# Patient Record
Sex: FEMALE | Race: WHITE | NOT HISPANIC OR LATINO | Employment: FULL TIME | URBAN - METROPOLITAN AREA
[De-identification: names, ages, dates, MRNs, and addresses within clinical notes are randomized per-mention and may not be internally consistent; named-entity substitution may affect disease eponyms.]

---

## 2023-04-17 LAB
ALANINE AMINOTRANSFERASE (SGPT) (U/L) IN SER/PLAS: 16 U/L (ref 7–45)
ALBUMIN (G/DL) IN SER/PLAS: 3.9 G/DL (ref 3.4–5)
ALKALINE PHOSPHATASE (U/L) IN SER/PLAS: 107 U/L (ref 33–110)
ANION GAP IN SER/PLAS: 12 MMOL/L (ref 10–20)
ASPARTATE AMINOTRANSFERASE (SGOT) (U/L) IN SER/PLAS: 15 U/L (ref 9–39)
BILIRUBIN TOTAL (MG/DL) IN SER/PLAS: 0.4 MG/DL (ref 0–1.2)
CALCIUM (MG/DL) IN SER/PLAS: 9 MG/DL (ref 8.6–10.3)
CARBON DIOXIDE, TOTAL (MMOL/L) IN SER/PLAS: 26 MMOL/L (ref 21–32)
CHLORIDE (MMOL/L) IN SER/PLAS: 105 MMOL/L (ref 98–107)
CHOLESTEROL (MG/DL) IN SER/PLAS: 149 MG/DL (ref 0–199)
CHOLESTEROL IN HDL (MG/DL) IN SER/PLAS: 60.2 MG/DL
CHOLESTEROL/HDL RATIO: 2.5
CREATININE (MG/DL) IN SER/PLAS: 0.8 MG/DL (ref 0.5–1.05)
ESTIMATED AVERAGE GLUCOSE FOR HBA1C: 134 MG/DL
GFR FEMALE: 89 ML/MIN/1.73M2
GLUCOSE (MG/DL) IN SER/PLAS: 112 MG/DL (ref 74–99)
HEMOGLOBIN A1C/HEMOGLOBIN TOTAL IN BLOOD: 6.3 %
LDL: 72 MG/DL (ref 0–99)
POTASSIUM (MMOL/L) IN SER/PLAS: 4.2 MMOL/L (ref 3.5–5.3)
PROTEIN TOTAL: 6.7 G/DL (ref 6.4–8.2)
SODIUM (MMOL/L) IN SER/PLAS: 139 MMOL/L (ref 136–145)
TRIGLYCERIDE (MG/DL) IN SER/PLAS: 84 MG/DL (ref 0–149)
UREA NITROGEN (MG/DL) IN SER/PLAS: 15 MG/DL (ref 6–23)
VLDL: 17 MG/DL (ref 0–40)

## 2023-04-21 ENCOUNTER — OFFICE VISIT (OUTPATIENT)
Dept: PRIMARY CARE | Facility: CLINIC | Age: 53
End: 2023-04-21
Payer: COMMERCIAL

## 2023-04-21 VITALS
BODY MASS INDEX: 45.24 KG/M2 | WEIGHT: 265.6 LBS | SYSTOLIC BLOOD PRESSURE: 110 MMHG | TEMPERATURE: 97.9 F | DIASTOLIC BLOOD PRESSURE: 80 MMHG

## 2023-04-21 DIAGNOSIS — E66.01 CLASS 3 SEVERE OBESITY WITHOUT SERIOUS COMORBIDITY WITH BODY MASS INDEX (BMI) OF 45.0 TO 49.9 IN ADULT, UNSPECIFIED OBESITY TYPE (MULTI): ICD-10-CM

## 2023-04-21 DIAGNOSIS — R73.03 PREDIABETES: Primary | ICD-10-CM

## 2023-04-21 PROBLEM — K57.30 DIVERTICULOSIS OF COLON: Status: ACTIVE | Noted: 2021-10-07

## 2023-04-21 PROBLEM — J98.4 SMALL AIRWAYS DISEASE: Status: ACTIVE | Noted: 2023-04-21

## 2023-04-21 PROBLEM — R32 URINARY INCONTINENCE: Status: ACTIVE | Noted: 2023-04-21

## 2023-04-21 PROBLEM — K21.9 GASTROESOPHAGEAL REFLUX DISEASE WITHOUT ESOPHAGITIS: Status: ACTIVE | Noted: 2021-09-27

## 2023-04-21 PROBLEM — E03.9 HYPOTHYROIDISM: Status: ACTIVE | Noted: 2022-02-22

## 2023-04-21 PROBLEM — R13.10 SWALLOWING IMPAIRED: Status: ACTIVE | Noted: 2023-04-21

## 2023-04-21 PROCEDURE — 1036F TOBACCO NON-USER: CPT | Performed by: FAMILY MEDICINE

## 2023-04-21 PROCEDURE — 99214 OFFICE O/P EST MOD 30 MIN: CPT | Performed by: FAMILY MEDICINE

## 2023-04-21 PROCEDURE — 3008F BODY MASS INDEX DOCD: CPT | Performed by: FAMILY MEDICINE

## 2023-04-21 RX ORDER — SEMAGLUTIDE 1 MG/.5ML
1 INJECTION, SOLUTION SUBCUTANEOUS
Qty: 2 ML | Refills: 0 | Status: SHIPPED | OUTPATIENT
Start: 2023-04-21 | End: 2023-07-24 | Stop reason: SDUPTHER

## 2023-04-21 RX ORDER — INHALER, ASSIST DEVICES
SPACER (EA) MISCELLANEOUS
COMMUNITY
Start: 2022-07-12 | End: 2024-02-09 | Stop reason: ALTCHOICE

## 2023-04-21 RX ORDER — ALBUTEROL SULFATE 90 UG/1
AEROSOL, METERED RESPIRATORY (INHALATION)
COMMUNITY
Start: 2022-07-08 | End: 2024-02-09 | Stop reason: ALTCHOICE

## 2023-04-21 RX ORDER — OMEPRAZOLE 20 MG/1
CAPSULE, DELAYED RELEASE ORAL
COMMUNITY
Start: 2021-08-17

## 2023-04-21 RX ORDER — LEVOTHYROXINE SODIUM 75 UG/1
75 TABLET ORAL
COMMUNITY
Start: 2022-05-06 | End: 2024-01-25 | Stop reason: SDUPTHER

## 2023-04-21 RX ORDER — SEMAGLUTIDE 0.5 MG/.5ML
0.5 INJECTION, SOLUTION SUBCUTANEOUS
Qty: 2 ML | Refills: 0 | Status: SHIPPED | OUTPATIENT
Start: 2023-04-21 | End: 2023-07-24 | Stop reason: SDUPTHER

## 2023-04-21 RX ORDER — SEMAGLUTIDE 0.25 MG/.5ML
0.25 INJECTION, SOLUTION SUBCUTANEOUS
Qty: 2 ML | Refills: 0 | Status: SHIPPED | OUTPATIENT
Start: 2023-04-21 | End: 2023-06-06 | Stop reason: SDUPTHER

## 2023-04-21 ASSESSMENT — PATIENT HEALTH QUESTIONNAIRE - PHQ9
1. LITTLE INTEREST OR PLEASURE IN DOING THINGS: NOT AT ALL
2. FEELING DOWN, DEPRESSED OR HOPELESS: NOT AT ALL
SUM OF ALL RESPONSES TO PHQ9 QUESTIONS 1 AND 2: 0

## 2023-04-21 ASSESSMENT — ENCOUNTER SYMPTOMS
DYSURIA: 0
WOUND: 0
FATIGUE: 0
SLEEP DISTURBANCE: 0
BLOOD IN STOOL: 0
NAUSEA: 0
SHORTNESS OF BREATH: 0
LIGHT-HEADEDNESS: 0
ARTHRALGIAS: 0
DYSPHORIC MOOD: 0
VOMITING: 0
ACTIVITY CHANGE: 0
PALPITATIONS: 0
NERVOUS/ANXIOUS: 0
ABDOMINAL PAIN: 0
COUGH: 0
DIZZINESS: 0
JOINT SWELLING: 0
CONSTIPATION: 0
EYE PAIN: 0
SINUS PRESSURE: 0
HEADACHES: 0
DIARRHEA: 0
APPETITE CHANGE: 0

## 2023-04-21 NOTE — PATIENT INSTRUCTIONS
Week of Semaglutide (Wegovy) Therapy     Dose (mg) Once Weekly  If a dose is not tolerated, consider delaying further dose increases for 4 weeks.    Weeks 1 through 4-0.25 mg once weekly    Weeks 5 through 8-0.5 mg once weekly    Weeks 9 though 12- 1 mg once weekly    Weeks 13 through 16- 1.7 mg once weekly    Please visit [email or web address]    Labs due in 3 months prior to next appt    Please continue to work on healthy diet and exercise    Call with any concerns

## 2023-04-21 NOTE — PROGRESS NOTES
Subjective   Patient ID: Joanne Weir is a 52 y.o. female who presents for Follow-up (Follow up labs ).    HPI   Here for follow-up  Over the last 3 months she has continue to work on healthy lifestyle habits.  Weight has not increased or decreased  She is taking all medications as directed  She has been checking her blood pressure at home and is consistently around 110 over 70s  Overall she is feeling well  She is frustrated by her difficult weight loss and wondering about medication options  She has never tried medications in the past  She has no history of medullary thyroid cancer or multiple endocrine neoplasia  Past medical, surgical, family and social history updated in chart  Review of Systems   Constitutional:  Negative for activity change, appetite change and fatigue.   HENT:  Negative for congestion, postnasal drip and sinus pressure.    Eyes:  Negative for pain and visual disturbance.   Respiratory:  Negative for cough and shortness of breath.    Cardiovascular:  Negative for chest pain, palpitations and leg swelling.   Gastrointestinal:  Negative for abdominal pain, blood in stool, constipation, diarrhea, nausea and vomiting.   Endocrine: Negative for cold intolerance and heat intolerance.   Genitourinary:  Negative for dysuria and menstrual problem.   Musculoskeletal:  Negative for arthralgias and joint swelling.   Skin:  Negative for rash and wound.   Neurological:  Negative for dizziness, light-headedness and headaches.   Psychiatric/Behavioral:  Negative for dysphoric mood and sleep disturbance. The patient is not nervous/anxious.        Objective   /80   Temp 36.6 °C (97.9 °F)   Wt 120 kg (265 lb 9.6 oz)   BMI 45.24 kg/m²     Physical Exam  Vitals and nursing note reviewed.   Constitutional:       Appearance: Normal appearance. She is normal weight.   HENT:      Head: Normocephalic and atraumatic.      Right Ear: External ear normal.      Left Ear: External ear normal.      Mouth/Throat:       Mouth: Mucous membranes are moist.   Eyes:      Conjunctiva/sclera: Conjunctivae normal.   Neck:      Thyroid: No thyroid mass, thyromegaly or thyroid tenderness.   Cardiovascular:      Rate and Rhythm: Normal rate and regular rhythm.      Heart sounds: Normal heart sounds.   Pulmonary:      Effort: Pulmonary effort is normal.      Breath sounds: Normal breath sounds.   Musculoskeletal:         General: No swelling.      Cervical back: Normal range of motion and neck supple.   Lymphadenopathy:      Cervical: No cervical adenopathy.   Skin:     General: Skin is warm and dry.      Capillary Refill: Capillary refill takes less than 2 seconds.   Neurological:      General: No focal deficit present.      Mental Status: She is alert. Mental status is at baseline.   Psychiatric:         Mood and Affect: Mood normal.         Behavior: Behavior normal.         Thought Content: Thought content normal.         Judgment: Judgment normal.         Assessment/Plan   Diagnoses and all orders for this visit:    Prediabetes  -     Hemoglobin A1C; Future  -     Comprehensive metabolic panel; Future  -     TSH with reflex to Free T4 if abnormal; Future  -     semaglutide, weight loss, (Wegovy) 0.25 mg/0.5 mL pen injector; Inject 0.25 mg under the skin every 7 days.  -     semaglutide, weight loss, (Wegovy) 0.5 mg/0.5 mL pen injector; Inject 0.5 mg under the skin every 7 days.  -     semaglutide, weight loss, (Wegovy) 1 mg/0.5 mL pen injector; Inject 1 mg under the skin every 7 days.  Class 3 severe obesity without serious comorbidity with body mass index (BMI) of 45.0 to 49.9 in adult, unspecified obesity type (CMS/HCC)  -     Hemoglobin A1C; Future  -     Comprehensive metabolic panel; Future  -     TSH with reflex to Free T4 if abnormal; Future  -     semaglutide, weight loss, (Wegovy) 0.25 mg/0.5 mL pen injector; Inject 0.25 mg under the skin every 7 days.  -     semaglutide, weight loss, (Wegovy) 0.5 mg/0.5 mL pen injector;  Inject 0.5 mg under the skin every 7 days.  -     semaglutide, weight loss, (Wegovy) 1 mg/0.5 mL pen injector; Inject 1 mg under the skin every 7 days.    Discussed with patient risk benefits side effects of Wegovy.  I believe she is a good candidate.  She has no contraindications to use  We will start and titrate up.  We will follow-up with labs and appointment in about 3 months  Discussed goal weight loss of 7.5% of body weight by that point  She will call with any concerns sooner  Instructed her to go to website for  for injection use and further patient education  Karson Mack, DO

## 2023-07-19 ENCOUNTER — LAB (OUTPATIENT)
Dept: LAB | Facility: LAB | Age: 53
End: 2023-07-19
Payer: COMMERCIAL

## 2023-07-19 DIAGNOSIS — E66.01 CLASS 3 SEVERE OBESITY WITHOUT SERIOUS COMORBIDITY WITH BODY MASS INDEX (BMI) OF 45.0 TO 49.9 IN ADULT, UNSPECIFIED OBESITY TYPE (MULTI): ICD-10-CM

## 2023-07-19 DIAGNOSIS — R73.03 PREDIABETES: ICD-10-CM

## 2023-07-19 LAB
ALANINE AMINOTRANSFERASE (SGPT) (U/L) IN SER/PLAS: 15 U/L (ref 7–45)
ALBUMIN (G/DL) IN SER/PLAS: 3.7 G/DL (ref 3.4–5)
ALKALINE PHOSPHATASE (U/L) IN SER/PLAS: 105 U/L (ref 33–110)
ANION GAP IN SER/PLAS: 9 MMOL/L (ref 10–20)
ASPARTATE AMINOTRANSFERASE (SGOT) (U/L) IN SER/PLAS: 14 U/L (ref 9–39)
BILIRUBIN TOTAL (MG/DL) IN SER/PLAS: 0.4 MG/DL (ref 0–1.2)
CALCIUM (MG/DL) IN SER/PLAS: 8.4 MG/DL (ref 8.6–10.3)
CARBON DIOXIDE, TOTAL (MMOL/L) IN SER/PLAS: 26 MMOL/L (ref 21–32)
CHLORIDE (MMOL/L) IN SER/PLAS: 107 MMOL/L (ref 98–107)
CREATININE (MG/DL) IN SER/PLAS: 0.73 MG/DL (ref 0.5–1.05)
ESTIMATED AVERAGE GLUCOSE FOR HBA1C: 123 MG/DL
GFR FEMALE: >90 ML/MIN/1.73M2
GLUCOSE (MG/DL) IN SER/PLAS: 102 MG/DL (ref 74–99)
HEMOGLOBIN A1C/HEMOGLOBIN TOTAL IN BLOOD: 5.9 %
POTASSIUM (MMOL/L) IN SER/PLAS: 4.3 MMOL/L (ref 3.5–5.3)
PROTEIN TOTAL: 6.4 G/DL (ref 6.4–8.2)
SODIUM (MMOL/L) IN SER/PLAS: 138 MMOL/L (ref 136–145)
THYROTROPIN (MIU/L) IN SER/PLAS BY DETECTION LIMIT <= 0.05 MIU/L: 3.4 MIU/L (ref 0.44–3.98)
UREA NITROGEN (MG/DL) IN SER/PLAS: 13 MG/DL (ref 6–23)

## 2023-07-19 PROCEDURE — 84443 ASSAY THYROID STIM HORMONE: CPT

## 2023-07-19 PROCEDURE — 80053 COMPREHEN METABOLIC PANEL: CPT

## 2023-07-19 PROCEDURE — 36415 COLL VENOUS BLD VENIPUNCTURE: CPT

## 2023-07-19 PROCEDURE — 83036 HEMOGLOBIN GLYCOSYLATED A1C: CPT

## 2023-07-21 ENCOUNTER — OFFICE VISIT (OUTPATIENT)
Dept: PRIMARY CARE | Facility: CLINIC | Age: 53
End: 2023-07-21
Payer: COMMERCIAL

## 2023-07-21 VITALS
SYSTOLIC BLOOD PRESSURE: 108 MMHG | TEMPERATURE: 97.9 F | BODY MASS INDEX: 46.19 KG/M2 | DIASTOLIC BLOOD PRESSURE: 78 MMHG | OXYGEN SATURATION: 98 % | WEIGHT: 271.2 LBS | HEART RATE: 83 BPM

## 2023-07-21 DIAGNOSIS — E03.9 HYPOTHYROIDISM, UNSPECIFIED TYPE: Primary | ICD-10-CM

## 2023-07-21 DIAGNOSIS — R73.03 PREDIABETES: ICD-10-CM

## 2023-07-21 DIAGNOSIS — K21.9 GASTROESOPHAGEAL REFLUX DISEASE WITHOUT ESOPHAGITIS: ICD-10-CM

## 2023-07-21 DIAGNOSIS — E66.01 CLASS 3 SEVERE OBESITY WITHOUT SERIOUS COMORBIDITY WITH BODY MASS INDEX (BMI) OF 45.0 TO 49.9 IN ADULT, UNSPECIFIED OBESITY TYPE (MULTI): ICD-10-CM

## 2023-07-21 PROCEDURE — 99213 OFFICE O/P EST LOW 20 MIN: CPT | Performed by: FAMILY MEDICINE

## 2023-07-21 PROCEDURE — 3008F BODY MASS INDEX DOCD: CPT | Performed by: FAMILY MEDICINE

## 2023-07-21 PROCEDURE — 1036F TOBACCO NON-USER: CPT | Performed by: FAMILY MEDICINE

## 2023-07-21 ASSESSMENT — ENCOUNTER SYMPTOMS
COUGH: 0
VOMITING: 0
SHORTNESS OF BREATH: 0
ABDOMINAL PAIN: 0
ARTHRALGIAS: 0
DIZZINESS: 0
HEADACHES: 0
DYSURIA: 0
LIGHT-HEADEDNESS: 0
JOINT SWELLING: 0
SINUS PRESSURE: 0
DIARRHEA: 0
DYSPHORIC MOOD: 0
EYE PAIN: 0
NAUSEA: 0
APPETITE CHANGE: 0
BLOOD IN STOOL: 0
NERVOUS/ANXIOUS: 0
FATIGUE: 0
WOUND: 0
ACTIVITY CHANGE: 0
PALPITATIONS: 0
SLEEP DISTURBANCE: 0
CONSTIPATION: 0

## 2023-07-21 NOTE — PROGRESS NOTES
Subjective   Patient ID: Joanne Weir is a 52 y.o. female who presents for Follow-up.    HPI   Here for follow-up.  Has been unable to get Wegovy due to shortages after first month supply  She continues to work on healthy habits but is having difficult time with weight loss    Taking levothyroxine and omeprazole as directed without any issues    No new concerns  Review of Systems   Constitutional:  Negative for activity change, appetite change and fatigue.   HENT:  Negative for congestion, postnasal drip and sinus pressure.    Eyes:  Negative for pain and visual disturbance.   Respiratory:  Negative for cough and shortness of breath.    Cardiovascular:  Negative for chest pain, palpitations and leg swelling.   Gastrointestinal:  Negative for abdominal pain, blood in stool, constipation, diarrhea, nausea and vomiting.   Endocrine: Negative for cold intolerance and heat intolerance.   Genitourinary:  Negative for dysuria and menstrual problem.   Musculoskeletal:  Negative for arthralgias and joint swelling.   Skin:  Negative for rash and wound.   Neurological:  Negative for dizziness, light-headedness and headaches.   Psychiatric/Behavioral:  Negative for dysphoric mood and sleep disturbance. The patient is not nervous/anxious.        Objective   /78   Pulse 83   Temp 36.6 °C (97.9 °F)   Wt 123 kg (271 lb 3.2 oz)   SpO2 98%   BMI 46.19 kg/m²     Physical Exam  Vitals and nursing note reviewed.   Constitutional:       Appearance: Normal appearance. She is normal weight.   HENT:      Head: Normocephalic and atraumatic.      Right Ear: External ear normal.      Left Ear: External ear normal.      Mouth/Throat:      Mouth: Mucous membranes are moist.   Eyes:      Conjunctiva/sclera: Conjunctivae normal.   Cardiovascular:      Rate and Rhythm: Normal rate and regular rhythm.      Heart sounds: Normal heart sounds.   Pulmonary:      Effort: Pulmonary effort is normal.      Breath sounds: Normal breath sounds.    Musculoskeletal:         General: No swelling.      Cervical back: Normal range of motion and neck supple.      Right lower leg: No edema.      Left lower leg: No edema.   Skin:     General: Skin is warm and dry.      Capillary Refill: Capillary refill takes less than 2 seconds.   Neurological:      General: No focal deficit present.      Mental Status: She is alert. Mental status is at baseline.   Psychiatric:         Mood and Affect: Mood normal.         Behavior: Behavior normal.         Thought Content: Thought content normal.         Judgment: Judgment normal.         Assessment/Plan   1. Hypothyroidism, unspecified type  Controlled continue current medication  - Lipid Panel; Future  - TSH with reflex to Free T4 if abnormal; Future    2. Prediabetes  Improving.  Continue to work on healthy lifestyle changes.  We will continue to try to get Wegovy for patient  - Hemoglobin A1C; Future  - Lipid Panel; Future  - Comprehensive Metabolic Panel; Future    3. Gastroesophageal reflux disease without esophagitis  ccm  - CBC and Auto Differential; Future    4. Class 3 severe obesity without serious comorbidity with body mass index (BMI) of 45.0 to 49.9 in adult, unspecified obesity type (CMS/HCC)  We will continue to try to get patient Wegovy.  Continue to work on healthy diet and activity  - Lipid Panel; Future      Karson Mack, DO

## 2023-07-24 DIAGNOSIS — E66.01 CLASS 3 SEVERE OBESITY WITHOUT SERIOUS COMORBIDITY WITH BODY MASS INDEX (BMI) OF 45.0 TO 49.9 IN ADULT, UNSPECIFIED OBESITY TYPE (MULTI): ICD-10-CM

## 2023-07-24 DIAGNOSIS — R73.03 PREDIABETES: ICD-10-CM

## 2023-07-24 RX ORDER — SEMAGLUTIDE 0.5 MG/.5ML
0.5 INJECTION, SOLUTION SUBCUTANEOUS
Qty: 2 ML | Refills: 0 | Status: SHIPPED | OUTPATIENT
Start: 2023-07-24 | End: 2023-09-13

## 2023-07-24 RX ORDER — SEMAGLUTIDE 0.25 MG/.5ML
0.25 INJECTION, SOLUTION SUBCUTANEOUS
Qty: 2 ML | Refills: 0 | Status: SHIPPED | OUTPATIENT
Start: 2023-07-24 | End: 2023-08-08 | Stop reason: SDUPTHER

## 2023-07-24 RX ORDER — SEMAGLUTIDE 1 MG/.5ML
1 INJECTION, SOLUTION SUBCUTANEOUS
Qty: 2 ML | Refills: 0 | Status: SHIPPED | OUTPATIENT
Start: 2023-07-24 | End: 2023-09-13

## 2023-08-08 DIAGNOSIS — E66.01 CLASS 3 SEVERE OBESITY WITHOUT SERIOUS COMORBIDITY WITH BODY MASS INDEX (BMI) OF 45.0 TO 49.9 IN ADULT, UNSPECIFIED OBESITY TYPE (MULTI): ICD-10-CM

## 2023-08-08 DIAGNOSIS — R73.03 PREDIABETES: ICD-10-CM

## 2023-08-08 RX ORDER — SEMAGLUTIDE 0.25 MG/.5ML
0.25 INJECTION, SOLUTION SUBCUTANEOUS
Qty: 2 ML | Refills: 0 | Status: SHIPPED | OUTPATIENT
Start: 2023-08-08 | End: 2023-09-13

## 2023-09-13 DIAGNOSIS — E66.01 CLASS 3 SEVERE OBESITY WITHOUT SERIOUS COMORBIDITY WITH BODY MASS INDEX (BMI) OF 45.0 TO 49.9 IN ADULT, UNSPECIFIED OBESITY TYPE (MULTI): ICD-10-CM

## 2023-09-13 DIAGNOSIS — R73.03 PREDIABETES: Primary | ICD-10-CM

## 2023-09-13 RX ORDER — SEMAGLUTIDE 1.7 MG/.75ML
1.7 INJECTION, SOLUTION SUBCUTANEOUS
Qty: 3 ML | Refills: 0 | Status: SHIPPED | OUTPATIENT
Start: 2023-09-13 | End: 2023-09-15

## 2023-09-15 DIAGNOSIS — R73.03 PREDIABETES: ICD-10-CM

## 2023-09-15 DIAGNOSIS — E66.01 CLASS 3 SEVERE OBESITY WITHOUT SERIOUS COMORBIDITY WITH BODY MASS INDEX (BMI) OF 45.0 TO 49.9 IN ADULT, UNSPECIFIED OBESITY TYPE (MULTI): ICD-10-CM

## 2023-09-15 RX ORDER — SEMAGLUTIDE 0.25 MG/.5ML
0.25 INJECTION, SOLUTION SUBCUTANEOUS
Qty: 2 ML | Refills: 0 | Status: SHIPPED | OUTPATIENT
Start: 2023-09-15 | End: 2024-02-09

## 2023-09-15 RX ORDER — SEMAGLUTIDE 0.5 MG/.5ML
0.5 INJECTION, SOLUTION SUBCUTANEOUS
Qty: 2 ML | Refills: 0 | Status: SHIPPED | OUTPATIENT
Start: 2023-09-15 | End: 2024-02-09

## 2024-01-24 ENCOUNTER — LAB (OUTPATIENT)
Dept: LAB | Facility: LAB | Age: 54
End: 2024-01-24
Payer: COMMERCIAL

## 2024-01-24 DIAGNOSIS — E03.9 HYPOTHYROIDISM, UNSPECIFIED: ICD-10-CM

## 2024-01-24 DIAGNOSIS — E03.9 HYPOTHYROIDISM, UNSPECIFIED TYPE: ICD-10-CM

## 2024-01-24 DIAGNOSIS — E66.01 CLASS 3 SEVERE OBESITY WITHOUT SERIOUS COMORBIDITY WITH BODY MASS INDEX (BMI) OF 45.0 TO 49.9 IN ADULT, UNSPECIFIED OBESITY TYPE (MULTI): ICD-10-CM

## 2024-01-24 DIAGNOSIS — K21.9 GASTROESOPHAGEAL REFLUX DISEASE WITHOUT ESOPHAGITIS: ICD-10-CM

## 2024-01-24 DIAGNOSIS — R73.03 PREDIABETES: ICD-10-CM

## 2024-01-24 LAB
ALBUMIN SERPL BCP-MCNC: 4 G/DL (ref 3.4–5)
ALP SERPL-CCNC: 98 U/L (ref 33–110)
ALT SERPL W P-5'-P-CCNC: 16 U/L (ref 7–45)
ANION GAP SERPL CALC-SCNC: 12 MMOL/L (ref 10–20)
AST SERPL W P-5'-P-CCNC: 16 U/L (ref 9–39)
BASOPHILS # BLD AUTO: 0.07 X10*3/UL (ref 0–0.1)
BASOPHILS NFR BLD AUTO: 0.9 %
BILIRUB SERPL-MCNC: 0.4 MG/DL (ref 0–1.2)
BUN SERPL-MCNC: 12 MG/DL (ref 6–23)
CALCIUM SERPL-MCNC: 8.7 MG/DL (ref 8.6–10.3)
CHLORIDE SERPL-SCNC: 105 MMOL/L (ref 98–107)
CHOLEST SERPL-MCNC: 142 MG/DL (ref 0–199)
CHOLESTEROL/HDL RATIO: 2.5
CO2 SERPL-SCNC: 27 MMOL/L (ref 21–32)
CREAT SERPL-MCNC: 0.72 MG/DL (ref 0.5–1.05)
EGFRCR SERPLBLD CKD-EPI 2021: >90 ML/MIN/1.73M*2
EOSINOPHIL # BLD AUTO: 0.67 X10*3/UL (ref 0–0.7)
EOSINOPHIL NFR BLD AUTO: 8.2 %
ERYTHROCYTE [DISTWIDTH] IN BLOOD BY AUTOMATED COUNT: 14.1 % (ref 11.5–14.5)
EST. AVERAGE GLUCOSE BLD GHB EST-MCNC: 126 MG/DL
GLUCOSE SERPL-MCNC: 107 MG/DL (ref 74–99)
HBA1C MFR BLD: 6 %
HCT VFR BLD AUTO: 42.4 % (ref 36–46)
HDLC SERPL-MCNC: 56 MG/DL
HGB BLD-MCNC: 13.3 G/DL (ref 12–16)
IMM GRANULOCYTES # BLD AUTO: 0.03 X10*3/UL (ref 0–0.7)
IMM GRANULOCYTES NFR BLD AUTO: 0.4 % (ref 0–0.9)
LDLC SERPL CALC-MCNC: 66 MG/DL
LYMPHOCYTES # BLD AUTO: 2.37 X10*3/UL (ref 1.2–4.8)
LYMPHOCYTES NFR BLD AUTO: 28.9 %
MCH RBC QN AUTO: 27 PG (ref 26–34)
MCHC RBC AUTO-ENTMCNC: 31.4 G/DL (ref 32–36)
MCV RBC AUTO: 86 FL (ref 80–100)
MONOCYTES # BLD AUTO: 0.7 X10*3/UL (ref 0.1–1)
MONOCYTES NFR BLD AUTO: 8.5 %
NEUTROPHILS # BLD AUTO: 4.35 X10*3/UL (ref 1.2–7.7)
NEUTROPHILS NFR BLD AUTO: 53.1 %
NON HDL CHOLESTEROL: 86 MG/DL (ref 0–149)
NRBC BLD-RTO: 0 /100 WBCS (ref 0–0)
PLATELET # BLD AUTO: 273 X10*3/UL (ref 150–450)
POTASSIUM SERPL-SCNC: 4.1 MMOL/L (ref 3.5–5.3)
PROT SERPL-MCNC: 6.7 G/DL (ref 6.4–8.2)
RBC # BLD AUTO: 4.93 X10*6/UL (ref 4–5.2)
SODIUM SERPL-SCNC: 140 MMOL/L (ref 136–145)
TRIGL SERPL-MCNC: 98 MG/DL (ref 0–149)
TSH SERPL-ACNC: 3.3 MIU/L (ref 0.44–3.98)
VLDL: 20 MG/DL (ref 0–40)
WBC # BLD AUTO: 8.2 X10*3/UL (ref 4.4–11.3)

## 2024-01-24 PROCEDURE — 80061 LIPID PANEL: CPT

## 2024-01-24 PROCEDURE — 84443 ASSAY THYROID STIM HORMONE: CPT

## 2024-01-24 PROCEDURE — 83036 HEMOGLOBIN GLYCOSYLATED A1C: CPT

## 2024-01-24 PROCEDURE — 36415 COLL VENOUS BLD VENIPUNCTURE: CPT

## 2024-01-24 PROCEDURE — 80053 COMPREHEN METABOLIC PANEL: CPT

## 2024-01-24 PROCEDURE — 85025 COMPLETE CBC W/AUTO DIFF WBC: CPT

## 2024-01-25 RX ORDER — LEVOTHYROXINE SODIUM 75 UG/1
75 TABLET ORAL DAILY
Qty: 90 TABLET | Refills: 3 | Status: SHIPPED | OUTPATIENT
Start: 2024-01-25

## 2024-01-26 ENCOUNTER — APPOINTMENT (OUTPATIENT)
Dept: PRIMARY CARE | Facility: CLINIC | Age: 54
End: 2024-01-26
Payer: COMMERCIAL

## 2024-02-09 ENCOUNTER — OFFICE VISIT (OUTPATIENT)
Dept: PRIMARY CARE | Facility: CLINIC | Age: 54
End: 2024-02-09
Payer: COMMERCIAL

## 2024-02-09 VITALS
SYSTOLIC BLOOD PRESSURE: 140 MMHG | BODY MASS INDEX: 47.96 KG/M2 | WEIGHT: 281.6 LBS | DIASTOLIC BLOOD PRESSURE: 100 MMHG | TEMPERATURE: 98 F

## 2024-02-09 DIAGNOSIS — R03.0 ELEVATED BLOOD PRESSURE READING: ICD-10-CM

## 2024-02-09 DIAGNOSIS — E03.9 HYPOTHYROIDISM, UNSPECIFIED TYPE: Primary | ICD-10-CM

## 2024-02-09 DIAGNOSIS — E66.01 CLASS 3 SEVERE OBESITY WITHOUT SERIOUS COMORBIDITY WITH BODY MASS INDEX (BMI) OF 45.0 TO 49.9 IN ADULT, UNSPECIFIED OBESITY TYPE (MULTI): ICD-10-CM

## 2024-02-09 DIAGNOSIS — R73.03 PREDIABETES: ICD-10-CM

## 2024-02-09 PROCEDURE — 99214 OFFICE O/P EST MOD 30 MIN: CPT | Performed by: FAMILY MEDICINE

## 2024-02-09 PROCEDURE — 1036F TOBACCO NON-USER: CPT | Performed by: FAMILY MEDICINE

## 2024-02-09 RX ORDER — SEMAGLUTIDE 0.25 MG/.5ML
0.25 INJECTION, SOLUTION SUBCUTANEOUS
Qty: 2 ML | Refills: 0 | Status: SHIPPED | OUTPATIENT
Start: 2024-02-09 | End: 2024-06-03 | Stop reason: ALTCHOICE

## 2024-02-09 ASSESSMENT — ENCOUNTER SYMPTOMS
ARTHRALGIAS: 0
NAUSEA: 0
SINUS PRESSURE: 0
ACTIVITY CHANGE: 0
VOMITING: 0
WOUND: 0
SLEEP DISTURBANCE: 0
EYE PAIN: 0
SHORTNESS OF BREATH: 0
BLOOD IN STOOL: 0
COUGH: 0
DIZZINESS: 0
LIGHT-HEADEDNESS: 0
JOINT SWELLING: 0
FATIGUE: 0
ABDOMINAL PAIN: 0
CONSTIPATION: 0
DYSPHORIC MOOD: 0
DYSURIA: 0
DIARRHEA: 0
APPETITE CHANGE: 0
HEADACHES: 0
PALPITATIONS: 0
NERVOUS/ANXIOUS: 0

## 2024-02-09 ASSESSMENT — PATIENT HEALTH QUESTIONNAIRE - PHQ9
1. LITTLE INTEREST OR PLEASURE IN DOING THINGS: NOT AT ALL
SUM OF ALL RESPONSES TO PHQ9 QUESTIONS 1 AND 2: 0
2. FEELING DOWN, DEPRESSED OR HOPELESS: NOT AT ALL

## 2024-02-09 NOTE — PATIENT INSTRUCTIONS
Watch BP at home and send numbers in next few weeks   If we cannot get wegovy we will try metformin

## 2024-02-09 NOTE — PROGRESS NOTES
Subjective   Patient ID: Joanne Weir is a 53 y.o. female who presents for Follow-up (Lab review).    HPI   Seen today with   Here for follow-up.  Has been unable to get Wegovy due to shortages after first month supply  She continues to work on healthy habits but is having difficult time with weight loss.  Is very frustrated and overall    Taking levothyroxine and omeprazole as directed without any issues    Noted blood pressure elevated today  No BP meds in past   No recent ambulatory monitoring    No new concerns  Review of Systems   Constitutional:  Negative for activity change, appetite change and fatigue.   HENT:  Negative for congestion, postnasal drip and sinus pressure.    Eyes:  Negative for pain and visual disturbance.   Respiratory:  Negative for cough and shortness of breath.    Cardiovascular:  Negative for chest pain, palpitations and leg swelling.   Gastrointestinal:  Negative for abdominal pain, blood in stool, constipation, diarrhea, nausea and vomiting.   Endocrine: Negative for cold intolerance and heat intolerance.   Genitourinary:  Negative for dysuria and menstrual problem.   Musculoskeletal:  Negative for arthralgias and joint swelling.   Skin:  Negative for rash and wound.   Neurological:  Negative for dizziness, light-headedness and headaches.   Psychiatric/Behavioral:  Negative for dysphoric mood and sleep disturbance. The patient is not nervous/anxious.        Objective   BP (!) 140/100   Temp 36.7 °C (98 °F)   Wt 128 kg (281 lb 9.6 oz)   BMI 47.96 kg/m²     Physical Exam  Vitals and nursing note reviewed.   Constitutional:       Appearance: Normal appearance. She is normal weight.   HENT:      Head: Normocephalic and atraumatic.      Right Ear: External ear normal.      Left Ear: External ear normal.      Mouth/Throat:      Mouth: Mucous membranes are moist.   Eyes:      Conjunctiva/sclera: Conjunctivae normal.   Cardiovascular:      Rate and Rhythm: Normal rate and regular  "rhythm.      Heart sounds: Normal heart sounds.   Pulmonary:      Effort: Pulmonary effort is normal.      Breath sounds: Normal breath sounds.   Musculoskeletal:         General: No swelling.      Cervical back: Normal range of motion and neck supple.      Right lower leg: No edema.      Left lower leg: No edema.   Skin:     General: Skin is warm and dry.      Capillary Refill: Capillary refill takes less than 2 seconds.   Neurological:      General: No focal deficit present.      Mental Status: She is alert. Mental status is at baseline.   Psychiatric:         Mood and Affect: Mood normal.         Behavior: Behavior normal.         Thought Content: Thought content normal.         Judgment: Judgment normal.     Lab Results   Component Value Date    TSH 3.30 01/24/2024    THYROIDPAB <28 08/31/2021     Lab Results   Component Value Date    HGBA1C 6.0 (H) 01/24/2024     Lab Results   Component Value Date    CHOL 142 01/24/2024    CHOL 149 04/17/2023    CHOL 143 05/02/2022     Lab Results   Component Value Date    HDL 56.0 01/24/2024    HDL 60.2 04/17/2023    HDL 51.9 05/02/2022     Lab Results   Component Value Date    LDLCALC 66 01/24/2024     Lab Results   Component Value Date    TRIG 98 01/24/2024    TRIG 84 04/17/2023    TRIG 73 05/02/2022     No components found for: \"CHOLHDL\"  Lab Results   Component Value Date    GLUCOSE 107 (H) 01/24/2024    CALCIUM 8.7 01/24/2024     01/24/2024    K 4.1 01/24/2024    CO2 27 01/24/2024     01/24/2024    BUN 12 01/24/2024    CREATININE 0.72 01/24/2024     Lab Results   Component Value Date    ALT 16 01/24/2024    AST 16 01/24/2024    ALKPHOS 98 01/24/2024    BILITOT 0.4 01/24/2024     Lab Results   Component Value Date    WBC 8.2 01/24/2024    HGB 13.3 01/24/2024    HCT 42.4 01/24/2024    MCV 86 01/24/2024     01/24/2024       Assessment/Plan   1. Prediabetes 2. Class 3 severe obesity without serious comorbidity with body mass index (BMI) of 45.0 to 49.9 in " adult, unspecified obesity type (CMS/HCC)  We will try again to get Wegovy at low starting dose.  If unable to we did discuss the option of trying metformin which patient is agreeable to  Discussed options for bariatric care team.  She will consider summa  - semaglutide, weight loss, (Wegovy) 0.25 mg/0.5 mL pen injector; Inject 0.25 mg under the skin every 7 days.  Dispense: 2 mL; Refill: 0      3. Hypothyroidism, unspecified type  Controlled. Continue current medicines/regimen.      4. Elevated blood pressure reading  Discussed home monitoring over the next week or so and call if consistently elevated over 140/90        Karson Mack, DO

## 2024-03-28 ENCOUNTER — TELEPHONE (OUTPATIENT)
Dept: PRIMARY CARE | Facility: CLINIC | Age: 54
End: 2024-03-28
Payer: COMMERCIAL

## 2024-03-28 NOTE — TELEPHONE ENCOUNTER
PRIOR AUTHORIZATION FOR PT'S [place] APPROVED VIA FAX FROM [employer]    APPROVED FROM 02/28/2024 UNTIL 03/28/2025

## 2024-04-17 DIAGNOSIS — R73.03 PREDIABETES: ICD-10-CM

## 2024-04-17 DIAGNOSIS — E66.01 CLASS 3 SEVERE OBESITY WITHOUT SERIOUS COMORBIDITY WITH BODY MASS INDEX (BMI) OF 45.0 TO 49.9 IN ADULT, UNSPECIFIED OBESITY TYPE (MULTI): Primary | ICD-10-CM

## 2024-04-17 RX ORDER — SEMAGLUTIDE 0.5 MG/.5ML
0.5 INJECTION, SOLUTION SUBCUTANEOUS
Qty: 2 ML | Refills: 0 | Status: SHIPPED | OUTPATIENT
Start: 2024-04-21 | End: 2024-06-03 | Stop reason: ALTCHOICE

## 2024-05-06 DIAGNOSIS — R73.03 PREDIABETES: ICD-10-CM

## 2024-05-06 DIAGNOSIS — E66.01 CLASS 3 SEVERE OBESITY WITHOUT SERIOUS COMORBIDITY WITH BODY MASS INDEX (BMI) OF 45.0 TO 49.9 IN ADULT, UNSPECIFIED OBESITY TYPE (MULTI): Primary | ICD-10-CM

## 2024-05-06 RX ORDER — SEMAGLUTIDE 1 MG/.5ML
1 INJECTION, SOLUTION SUBCUTANEOUS
Qty: 2 ML | Refills: 0 | Status: SHIPPED | OUTPATIENT
Start: 2024-05-12 | End: 2024-06-03 | Stop reason: ALTCHOICE

## 2024-06-03 DIAGNOSIS — R73.03 PREDIABETES: Primary | ICD-10-CM

## 2024-06-03 DIAGNOSIS — E66.01 CLASS 3 SEVERE OBESITY WITHOUT SERIOUS COMORBIDITY WITH BODY MASS INDEX (BMI) OF 45.0 TO 49.9 IN ADULT, UNSPECIFIED OBESITY TYPE (MULTI): ICD-10-CM

## 2024-06-03 RX ORDER — SEMAGLUTIDE 1.7 MG/.75ML
1.7 INJECTION, SOLUTION SUBCUTANEOUS
Qty: 3 ML | Refills: 0 | Status: SHIPPED | OUTPATIENT
Start: 2024-06-09 | End: 2024-07-01

## 2024-07-08 DIAGNOSIS — R73.03 PREDIABETES: ICD-10-CM

## 2024-07-08 DIAGNOSIS — E66.01 CLASS 3 SEVERE OBESITY WITHOUT SERIOUS COMORBIDITY WITH BODY MASS INDEX (BMI) OF 45.0 TO 49.9 IN ADULT, UNSPECIFIED OBESITY TYPE (MULTI): Primary | ICD-10-CM

## 2024-07-08 RX ORDER — SEMAGLUTIDE 2.4 MG/.75ML
2.4 INJECTION, SOLUTION SUBCUTANEOUS
Qty: 3 ML | Refills: 0 | Status: CANCELLED | OUTPATIENT
Start: 2024-07-14 | End: 2024-08-05

## 2024-07-10 RX ORDER — SEMAGLUTIDE 2.4 MG/.75ML
2.4 INJECTION, SOLUTION SUBCUTANEOUS
Qty: 3 ML | Refills: 3 | Status: SHIPPED | OUTPATIENT
Start: 2024-07-14 | End: 2024-08-05

## 2024-07-18 ENCOUNTER — HOSPITAL ENCOUNTER (OUTPATIENT)
Dept: RADIOLOGY | Facility: CLINIC | Age: 54
Discharge: HOME | End: 2024-07-18
Payer: COMMERCIAL

## 2024-07-18 VITALS — WEIGHT: 270 LBS | BODY MASS INDEX: 46.1 KG/M2 | HEIGHT: 64 IN

## 2024-07-18 DIAGNOSIS — Z12.31 ENCOUNTER FOR SCREENING MAMMOGRAM FOR MALIGNANT NEOPLASM OF BREAST: ICD-10-CM

## 2024-07-18 PROCEDURE — 77067 SCR MAMMO BI INCL CAD: CPT

## 2024-08-09 ENCOUNTER — APPOINTMENT (OUTPATIENT)
Dept: PRIMARY CARE | Facility: CLINIC | Age: 54
End: 2024-08-09
Payer: COMMERCIAL

## 2024-08-09 VITALS
SYSTOLIC BLOOD PRESSURE: 118 MMHG | TEMPERATURE: 98 F | BODY MASS INDEX: 45.04 KG/M2 | HEIGHT: 64 IN | WEIGHT: 263.8 LBS | DIASTOLIC BLOOD PRESSURE: 80 MMHG

## 2024-08-09 DIAGNOSIS — E03.9 HYPOTHYROIDISM, UNSPECIFIED TYPE: ICD-10-CM

## 2024-08-09 DIAGNOSIS — E66.01 CLASS 3 SEVERE OBESITY WITHOUT SERIOUS COMORBIDITY WITH BODY MASS INDEX (BMI) OF 45.0 TO 49.9 IN ADULT, UNSPECIFIED OBESITY TYPE (MULTI): ICD-10-CM

## 2024-08-09 DIAGNOSIS — R73.03 PREDIABETES: Primary | ICD-10-CM

## 2024-08-09 PROCEDURE — 3008F BODY MASS INDEX DOCD: CPT | Performed by: FAMILY MEDICINE

## 2024-08-09 PROCEDURE — 99214 OFFICE O/P EST MOD 30 MIN: CPT | Performed by: FAMILY MEDICINE

## 2024-08-09 PROCEDURE — 1036F TOBACCO NON-USER: CPT | Performed by: FAMILY MEDICINE

## 2024-08-09 ASSESSMENT — PATIENT HEALTH QUESTIONNAIRE - PHQ9
2. FEELING DOWN, DEPRESSED OR HOPELESS: NOT AT ALL
SUM OF ALL RESPONSES TO PHQ9 QUESTIONS 1 AND 2: 0
1. LITTLE INTEREST OR PLEASURE IN DOING THINGS: NOT AT ALL

## 2024-08-09 NOTE — PROGRESS NOTES
Subjective   Patient ID: Joanne Weir is a 54 y.o. female who presents for Follow-up.    HPI   Overall doing very well  Starting weight 281  Current weight 263  She is up to 2.4 g weekly of Wegovy and feels her weight loss is really starting to   She has some mild nausea fatigue but overall tolerating  She continues to work on healthy diet and exercise in conjunction with medication  No new concerns  Review of Systems  As noted HPI  Objective   /80   Temp 36.7 °C (98 °F)   Wt 120 kg (263 lb 12.8 oz)   BMI 44.93 kg/m²     Physical Exam  Vitals and nursing note reviewed.   Constitutional:       Appearance: Normal appearance. She is obese.   HENT:      Head: Normocephalic and atraumatic.      Right Ear: External ear normal.      Left Ear: External ear normal.      Mouth/Throat:      Mouth: Mucous membranes are moist.   Eyes:      Conjunctiva/sclera: Conjunctivae normal.   Cardiovascular:      Rate and Rhythm: Normal rate and regular rhythm.      Heart sounds: Normal heart sounds.   Pulmonary:      Effort: Pulmonary effort is normal.      Breath sounds: Normal breath sounds.   Musculoskeletal:         General: No swelling.      Cervical back: Normal range of motion and neck supple.   Skin:     General: Skin is warm and dry.      Capillary Refill: Capillary refill takes less than 2 seconds.   Neurological:      General: No focal deficit present.      Mental Status: She is alert. Mental status is at baseline.   Psychiatric:         Mood and Affect: Mood normal.         Behavior: Behavior normal.         Thought Content: Thought content normal.         Judgment: Judgment normal.         Assessment/Plan   1. Prediabetes  Hemoglobin A1C      2. Hypothyroidism, unspecified type  TSH with reflex to Free T4 if abnormal      3. Class 3 severe obesity without serious comorbidity with body mass index (BMI) of 45.0 to 49.9 in adult, unspecified obesity type (Multi)  Lipid Panel    Comprehensive Metabolic Panel       Overall doing very well on Wegovy.  Current dosing plan to follow-up and recheck labs in 6 months unless concerns sooner      Karson Mack, DO

## 2024-08-20 ENCOUNTER — LAB (OUTPATIENT)
Dept: LAB | Facility: LAB | Age: 54
End: 2024-08-20
Payer: COMMERCIAL

## 2024-08-20 DIAGNOSIS — E03.9 HYPOTHYROIDISM, UNSPECIFIED TYPE: ICD-10-CM

## 2024-08-20 DIAGNOSIS — E66.01 CLASS 3 SEVERE OBESITY WITHOUT SERIOUS COMORBIDITY WITH BODY MASS INDEX (BMI) OF 45.0 TO 49.9 IN ADULT, UNSPECIFIED OBESITY TYPE (MULTI): ICD-10-CM

## 2024-08-20 DIAGNOSIS — R73.03 PREDIABETES: ICD-10-CM

## 2024-08-20 LAB
ALBUMIN SERPL BCP-MCNC: 4 G/DL (ref 3.4–5)
ALP SERPL-CCNC: 100 U/L (ref 33–110)
ALT SERPL W P-5'-P-CCNC: 15 U/L (ref 7–45)
ANION GAP SERPL CALC-SCNC: 11 MMOL/L (ref 10–20)
AST SERPL W P-5'-P-CCNC: 15 U/L (ref 9–39)
BILIRUB SERPL-MCNC: 0.4 MG/DL (ref 0–1.2)
BUN SERPL-MCNC: 10 MG/DL (ref 6–23)
CALCIUM SERPL-MCNC: 8.9 MG/DL (ref 8.6–10.3)
CHLORIDE SERPL-SCNC: 104 MMOL/L (ref 98–107)
CHOLEST SERPL-MCNC: 141 MG/DL (ref 0–199)
CHOLESTEROL/HDL RATIO: 2.4
CO2 SERPL-SCNC: 26 MMOL/L (ref 21–32)
CREAT SERPL-MCNC: 0.86 MG/DL (ref 0.5–1.05)
EGFRCR SERPLBLD CKD-EPI 2021: 81 ML/MIN/1.73M*2
EST. AVERAGE GLUCOSE BLD GHB EST-MCNC: 117 MG/DL
GLUCOSE SERPL-MCNC: 91 MG/DL (ref 74–99)
HBA1C MFR BLD: 5.7 %
HDLC SERPL-MCNC: 58 MG/DL
LDLC SERPL CALC-MCNC: 68 MG/DL
NON HDL CHOLESTEROL: 83 MG/DL (ref 0–149)
POTASSIUM SERPL-SCNC: 4.2 MMOL/L (ref 3.5–5.3)
PROT SERPL-MCNC: 6.9 G/DL (ref 6.4–8.2)
SODIUM SERPL-SCNC: 137 MMOL/L (ref 136–145)
TRIGL SERPL-MCNC: 77 MG/DL (ref 0–149)
TSH SERPL-ACNC: 2.77 MIU/L (ref 0.44–3.98)
VLDL: 15 MG/DL (ref 0–40)

## 2024-08-20 PROCEDURE — 36415 COLL VENOUS BLD VENIPUNCTURE: CPT

## 2024-08-20 PROCEDURE — 84443 ASSAY THYROID STIM HORMONE: CPT

## 2024-08-20 PROCEDURE — 80061 LIPID PANEL: CPT

## 2024-08-20 PROCEDURE — 83036 HEMOGLOBIN GLYCOSYLATED A1C: CPT

## 2024-08-20 PROCEDURE — 80053 COMPREHEN METABOLIC PANEL: CPT

## 2024-10-26 DIAGNOSIS — R73.03 PREDIABETES: ICD-10-CM

## 2024-10-26 DIAGNOSIS — E66.01 CLASS 3 SEVERE OBESITY WITHOUT SERIOUS COMORBIDITY WITH BODY MASS INDEX (BMI) OF 45.0 TO 49.9 IN ADULT, UNSPECIFIED OBESITY TYPE: ICD-10-CM

## 2024-10-26 DIAGNOSIS — E66.813 CLASS 3 SEVERE OBESITY WITHOUT SERIOUS COMORBIDITY WITH BODY MASS INDEX (BMI) OF 45.0 TO 49.9 IN ADULT, UNSPECIFIED OBESITY TYPE: ICD-10-CM

## 2024-10-28 RX ORDER — SEMAGLUTIDE 2.4 MG/.75ML
2.4 INJECTION, SOLUTION SUBCUTANEOUS
Qty: 3 ML | Refills: 3 | Status: SHIPPED | OUTPATIENT
Start: 2024-11-03 | End: 2024-11-25

## 2024-11-21 ENCOUNTER — HOSPITAL ENCOUNTER (OUTPATIENT)
Dept: RADIOLOGY | Facility: CLINIC | Age: 54
Discharge: HOME | End: 2024-11-21
Payer: COMMERCIAL

## 2024-11-21 DIAGNOSIS — N64.52 BREAST DISCHARGE: ICD-10-CM

## 2024-11-21 PROCEDURE — 77061 BREAST TOMOSYNTHESIS UNI: CPT | Mod: RIGHT SIDE | Performed by: STUDENT IN AN ORGANIZED HEALTH CARE EDUCATION/TRAINING PROGRAM

## 2024-11-21 PROCEDURE — 76642 ULTRASOUND BREAST LIMITED: CPT | Mod: RT

## 2024-11-21 PROCEDURE — 76642 ULTRASOUND BREAST LIMITED: CPT | Mod: RIGHT SIDE | Performed by: STUDENT IN AN ORGANIZED HEALTH CARE EDUCATION/TRAINING PROGRAM

## 2024-11-21 PROCEDURE — 77065 DX MAMMO INCL CAD UNI: CPT | Mod: RIGHT SIDE | Performed by: STUDENT IN AN ORGANIZED HEALTH CARE EDUCATION/TRAINING PROGRAM

## 2024-11-21 PROCEDURE — 77061 BREAST TOMOSYNTHESIS UNI: CPT | Mod: RT

## 2024-11-21 PROCEDURE — 76982 USE 1ST TARGET LESION: CPT | Mod: RT

## 2024-11-21 NOTE — PROGRESS NOTES
Joanne Weir female   1970 54 y.o.   [id number]           [facility]  Joanne Weir is a 54 y.o.  female referred by [facility] for [facility] consultation. She had episode of right bloody nipple discharge x 2 days about 2 weeks ago then changing over to yellow, not resolved. She has history of right breast benign biopsy.     She saw Janette Mena 2024 for this issue. 2024 serum prolactin 11.6 ng/ml. 2024 cytology on discharge noted a few atypical ductal cell with increased nuclear/cytoplastic ration with proteinaceous material, degenerating RBCs and scattered foam cells.     BREAST IMAGIN2024 right diagnostic mammogram and ultrasound, BI-RADS Category 4, right breast 4:0 2cm from nipple 0.4 x 0.3 x 0.4cm indeterminate mass warranting ultrasound core biopsy, subareolar ultrasound is normal.   2024 bilateral screening mammogram, BI-RADS Category 1.     REPRODUCTIVE HISTORY: menarche age 14, , first birth age 29,  4 mo, no OCPs, perimenopausal, no HRT, scattered tissue    FAMILY CANCER HISTORY:   Maternal Uncle: stomach cancer  Maternal cousin: childhood leukemia (age 10)      REVIEW OF SYSTEMS    Constitutional:  Negative for appetite change, fatigue, fever and unexpected weight change.   HENT:  Negative for ear pain, hearing loss, nosebleeds, sore throat and trouble swallowing.    Eyes:  Negative for discharge, itching and visual disturbance.   Respiratory:  Negative for cough, chest tightness and shortness of breath.    Cardiovascular:  Negative for chest pain, palpitations and leg swelling.   Breast: as indicated in HPI  Gastrointestinal:  Negative for abdominal pain, constipation, diarrhea and nausea.   Endocrine: Negative for cold intolerance and heat intolerance.   Genitourinary:  Negative for dysuria, frequency, hematuria, pelvic pain and vaginal bleeding.   Musculoskeletal:  Negative for arthralgias, back pain, gait problem,  joint swelling and myalgias.   Skin:  Negative for color change and rash.   Allergic/Immunologic: Negative for environmental allergies and food allergies.   Neurological:  Negative for dizziness, tremors, speech difficulty, weakness, numbness and headaches.   Hematological:  Does not bruise/bleed easily.   Psychiatric/Behavioral:  Negative for agitation, dysphoric mood and sleep disturbance. The patient is not nervous/anxious.         MEDICATIONS  Current Outpatient Medications   Medication Instructions    levothyroxine (SYNTHROID, LEVOXYL) 75 mcg, oral, Daily    omeprazole (PriLOSEC) 20 mg DR capsule Take by mouth.    Wegovy 2.4 mg, subcutaneous, Once Weekly        ALLERGIES  Allergies   Allergen Reactions    Penicillins Hives, Itching and Rash        Patient Active Problem List    Diagnosis Date Noted    Prediabetes 04/21/2023    Small airways disease 04/21/2023    Swallowing impaired 04/21/2023    Urinary incontinence 04/21/2023    Hypothyroidism 02/22/2022    Diverticulosis of colon 10/07/2021    Gastroesophageal reflux disease without esophagitis 09/27/2021     Past Medical History:   Diagnosis Date    Personal history of other diseases of the digestive system     History of esophageal stricture      Past Surgical History:   Procedure Laterality Date    BREAST BIOPSY Right 2012    OTHER SURGICAL HISTORY  04/25/2019    Breast biopsy core needle      Family History   Problem Relation Name Age of Onset    Rheumatologic disease Mother      Stroke Father Karri     Diabetes Father Karri     Stomach cancer Mother's Brother      Diabetes Maternal Grandmother Kat           SOCIAL HISTORY      Social History     Tobacco Use    Smoking status: Never     Passive exposure: Past    Smokeless tobacco: Never   Substance Use Topics    Alcohol use: Yes     Alcohol/week: 3.0 standard drinks of alcohol     Types: 3 Standard drinks or equivalent per week        VITALS  Vitals:    11/22/24 1245   BP: 124/85   Pulse: 74   Temp: 36.9  °C (98.5 °F)        PHYSICAL EXAM  Patient is alert and oriented x3, with appropriate mood. The gait is steady and hand grasps are equal. Sclera clear. The breasts are nearly symmetrical. The tissue is soft without palpable abnormalities, discrete nodules or masses. The skin and nipples appear normal. There is no cervical, supraclavicular, or axillary lymphadenopathy palpable. Heart rate and rhythm normal, S1 and S2 appreciated. The lungs are clear bilaterally. Abdomen is soft & non-tender.    Physical Exam     IMAGING    Time was spent viewing digital images of the radiology testing with the patient. I explained the results in depth, along with suggested explanation for follow up recommendations based on the testing results.          ORDERS  Orders Placed This Encounter   Procedures    BI US guided breast localization and biopsy right     Standing Status:   Future     Standing Expiration Date:   1/21/2026     Order Specific Question:   Reason for exam:     Answer:   right     Order Specific Question:   Radiologist to Determine Optimal Study     Answer:   Yes     Order Specific Question:   Release result to [employer]     Answer:   Immediate     Order Specific Question:   Is this exam part of a Research Study? If Yes, link this order to the research study     Answer:   No          ASSESSMENT/PLAN  1. Mass of upper outer quadrant of right breast  BI US guided breast localization and biopsy right             Follow up for is to proceed to biopsy.  Proceed to biopsy. A breast radiology physician will perform the biopsy. Results are usually available in about 7 business days. I will call patient with results and instruct on next steps and plan.         Marsha Vanegas, [facility]

## 2024-11-22 ENCOUNTER — PROCEDURE VISIT (OUTPATIENT)
Dept: SURGICAL ONCOLOGY | Facility: CLINIC | Age: 54
End: 2024-11-22
Payer: COMMERCIAL

## 2024-11-22 VITALS
HEART RATE: 74 BPM | SYSTOLIC BLOOD PRESSURE: 124 MMHG | TEMPERATURE: 98.5 F | DIASTOLIC BLOOD PRESSURE: 85 MMHG | WEIGHT: 259 LBS | BODY MASS INDEX: 44.11 KG/M2

## 2024-11-22 DIAGNOSIS — N63.11 MASS OF UPPER OUTER QUADRANT OF RIGHT BREAST: Primary | ICD-10-CM

## 2024-11-22 PROCEDURE — 99204 OFFICE O/P NEW MOD 45 MIN: CPT | Performed by: NURSE PRACTITIONER

## 2024-11-22 PROCEDURE — 99214 OFFICE O/P EST MOD 30 MIN: CPT | Performed by: NURSE PRACTITIONER

## 2024-11-22 ASSESSMENT — PATIENT HEALTH QUESTIONNAIRE - PHQ9
SUM OF ALL RESPONSES TO PHQ9 QUESTIONS 1 & 2: 0
2. FEELING DOWN, DEPRESSED OR HOPELESS: NOT AT ALL
1. LITTLE INTEREST OR PLEASURE IN DOING THINGS: NOT AT ALL

## 2024-11-22 ASSESSMENT — PAIN SCALES - GENERAL: PAINLEVEL_OUTOF10: 2

## 2024-11-22 NOTE — PATIENT INSTRUCTIONS
Thank you for coming to see me today at [facility]. I recommend biopsy. A breast radiology physician will perform the biopsy. Possible diagnoses include benign, atypical, or cancer as we discussed.  Bruising and mild discomfort after the biopsy is normal and will improve. I normally have results in 7-10 business days. I will call you with results, please have your phone handy to take my call.    IMPORTANT INFORMATION REGARDING YOUR RESULTS  If you receive medical information from My [facility] Personal Health Record (online chart) your results will be released into your chart. This means you may view or see results of your biopsy or procedure before I contact you directly. If this occurs, please call the office and we will discuss your results over the phone.     You can see your health information, review clinical summaries from office visits & test results online when you follow your health with MY  Chart, a personal health record. To sign up go to [email or web address]. If you need assistance with signing up or trouble getting into your account call [employer] Patient Line 24/7 at [phone].     Should you have any questions or concerns after biopsy, please do not hesitate to call my office at [phone]. If it has been more than a week since your biopsy was performed and you have not been given the results, please call my office [phone]. Thank you for choosing [facility] and trusting me as your healthcare provider. I am honored to be a provider on your health care team and I remain dedicated to helping you achieve your health goals.

## 2024-11-29 ENCOUNTER — HOSPITAL ENCOUNTER (OUTPATIENT)
Dept: RADIOLOGY | Facility: CLINIC | Age: 54
Discharge: HOME | End: 2024-11-29
Payer: COMMERCIAL

## 2024-11-29 DIAGNOSIS — N63.11 MASS OF UPPER OUTER QUADRANT OF RIGHT BREAST: ICD-10-CM

## 2024-11-29 DIAGNOSIS — N63.0 BREAST MASS IN FEMALE: ICD-10-CM

## 2024-11-29 PROCEDURE — 19083 BX BREAST 1ST LESION US IMAG: CPT | Mod: RT

## 2024-11-29 PROCEDURE — 2500000004 HC RX 250 GENERAL PHARMACY W/ HCPCS (ALT 636 FOR OP/ED): Performed by: RADIOLOGY

## 2024-11-29 PROCEDURE — A4648 IMPLANTABLE TISSUE MARKER: HCPCS

## 2024-11-29 PROCEDURE — 2720000007 HC OR 272 NO HCPCS

## 2024-11-29 ASSESSMENT — PAIN SCALES - GENERAL: PAINLEVEL_OUTOF10: 0 - NO PAIN

## 2024-11-29 NOTE — DISCHARGE INSTRUCTIONS
AFTER THE TEST  A steri-strip and bandage will be placed over the incision. You may shower after 24 hours. Remove bandage after 24 hours. Remove bandage after the shower. Leave the steri-strips in place to fall off on their own. If after 1 week the steri-strips are still on, you may remove them. Avoid swimming or soaking in tub for 3 days.     You may have mild discomfort at the test site. If needed, you may take Tylenol (Acetaminophen) for pain. Please avoid taking NSAIDs, Motrin, Advil, Aleve, or ibuprofen for 24 hours following the biopsy. After 24 hours you may resume NSAIDSs.     If you take aspirin, Plavix, Coumadin, Xarelto or Eliquis please tell us. If these medications were stopped by your provider, please ask them when to resume.     You may have some tenderness, bruising or slight bleeding at the site. Please apply ice packs to the site for 15 minutes on and 15 minutes off for a 2 hour minimum.     Most people can return to their usual routine after the procedure. Avoid Strenuous activity for 24 hours.     Sleep in a bra the night after your biopsy. Continue to do so for comfort.     Call your provider if you have any of the following symptoms :  Fever  Increased pain  Increased bleeding  Redness  Increased swelling  Yellowish drainage  Your provider will get the biopsy results within 5 - 7 days. Call your provider with any questions.     Patient education brochure and pain/comfort measures have been reviewed.   Phone number provided to contact [facility] if problems arise.     Patient verbalized understanding of home going instructions.  Patient left at 1150

## 2024-11-29 NOTE — Clinical Note
Done.  Pressure held x 10 minutes, incision dry, steri strips intact and compression dressing applied.

## 2024-12-02 ENCOUNTER — HOSPITAL ENCOUNTER (OUTPATIENT)
Dept: RADIOLOGY | Facility: CLINIC | Age: 54
End: 2024-12-02
Payer: COMMERCIAL

## 2024-12-02 ENCOUNTER — APPOINTMENT (OUTPATIENT)
Dept: RADIOLOGY | Facility: CLINIC | Age: 54
End: 2024-12-02
Payer: COMMERCIAL

## 2024-12-02 ENCOUNTER — TELEPHONE (OUTPATIENT)
Dept: RADIOLOGY | Facility: CLINIC | Age: 54
End: 2024-12-02

## 2024-12-05 ENCOUNTER — TELEPHONE (OUTPATIENT)
Dept: SURGERY | Facility: HOSPITAL | Age: 54
End: 2024-12-05
Payer: COMMERCIAL

## 2024-12-05 DIAGNOSIS — Z12.31 ENCOUNTER FOR SCREENING MAMMOGRAM FOR MALIGNANT NEOPLASM OF BREAST: Primary | ICD-10-CM

## 2024-12-05 LAB
LAB AP ASR DISCLAIMER: NORMAL
LABORATORY COMMENT REPORT: NORMAL
PATH REPORT.COMMENTS IMP SPEC: NORMAL
PATH REPORT.FINAL DX SPEC: NORMAL
PATH REPORT.GROSS SPEC: NORMAL
PATH REPORT.TOTAL CANCER: NORMAL

## 2024-12-05 NOTE — TELEPHONE ENCOUNTER
"Result Communication    Resulted Orders   Surgical Pathology Exam   Result Value Ref Range    Case Report       Surgical Pathology                                Case: [id number]                                  Authorizing Provider:  FILIPPO Vidal-JOSE  Collected:           11/29/2024 1129              Ordering Location:     [facility]       Received:            11/29/2024 1227                                     Center                                                                       Pathologist:           Chrystal Carter MD                                                            Specimen:    BREAST CORE BIOPSY RIGHT, Right Breast 4:00 2CMFN                                          FINAL DIAGNOSIS       A. Right breast mass, 4:00 2 cm from nipple, ultrasound guided core biopsy:   -- Intraductal papilloma with usual ductal hyperplasia, apocrine metaplasia and microcysts, see note.     Note: Immunohistochemical stains for CK5/6 and ER are heterogeneously positive, supporting the diagnosis.                 By the signature on this report, the individual or group listed as making the Final Interpretation/Diagnosis certifies that they have reviewed this case.       Comment       The case was reviewed at [facility]ensus Conference via Zoom with concurrence.      Gross Description       A: Received in formalin, labeled with the patient´s name and hospital number and \"right breast 4:00 2 cm FN\", are multiple irregular/cylindrical segments of yellow-white fatty soft tissue aggregating to 1.5 x 1.5 x 0.3 cm.  The specimen is submitted in toto in two cassettes.  [facility]    NOTE:  Ischemia time: Not provided.  This specimen was placed into formalin at: 11/29/24  11:29.      Disclaimer       One or more of the reagents used to perform assays on this specimen MAY have contained components considered to be analyte specific reagents (ASR's).  ASR's have not been cleared or approved by the U.S. Food and Drug " Administration.  These assays were developed and their performance characteristics determined by the Department of Pathology at [facility]. The FDA does not require this test to go through premarket FDA review. This test is used for clinical purposes. It should not be regarded as investigational or for research. This laboratory is certified under the Clinical Laboratory Improvement Amendments (CLIA) as qualified to perform high complexity clinical laboratory testing.  The assays were performed with appropriate positive and negative controls which stained appropriately.         12:51 PM      Results were successfully communicated with the patient and they acknowledged their understanding. Informed breast biopsy is benign and concordant intraductal papilloma and likely removed by biopsy alone. She denies any nipple discharge. Plan for screening mammogram 7/2025. If she has episode of nipple discharge I would recommend a breast MRI.

## 2025-01-12 DIAGNOSIS — E03.9 HYPOTHYROIDISM, UNSPECIFIED: ICD-10-CM

## 2025-01-13 RX ORDER — LEVOTHYROXINE SODIUM 75 UG/1
75 TABLET ORAL DAILY
Qty: 90 TABLET | Refills: 1 | Status: SHIPPED | OUTPATIENT
Start: 2025-01-13

## 2025-02-06 DIAGNOSIS — R73.03 PREDIABETES: ICD-10-CM

## 2025-02-06 DIAGNOSIS — Z00.00 ROUTINE GENERAL MEDICAL EXAMINATION AT A HEALTH CARE FACILITY: ICD-10-CM

## 2025-02-06 DIAGNOSIS — E03.9 HYPOTHYROIDISM, UNSPECIFIED TYPE: Primary | ICD-10-CM

## 2025-02-14 ENCOUNTER — APPOINTMENT (OUTPATIENT)
Dept: PRIMARY CARE | Facility: CLINIC | Age: 55
End: 2025-02-14
Payer: COMMERCIAL

## 2025-02-16 DIAGNOSIS — R73.03 PREDIABETES: ICD-10-CM

## 2025-02-16 DIAGNOSIS — E66.813 CLASS 3 SEVERE OBESITY WITHOUT SERIOUS COMORBIDITY WITH BODY MASS INDEX (BMI) OF 45.0 TO 49.9 IN ADULT, UNSPECIFIED OBESITY TYPE: ICD-10-CM

## 2025-02-16 DIAGNOSIS — E66.01 CLASS 3 SEVERE OBESITY WITHOUT SERIOUS COMORBIDITY WITH BODY MASS INDEX (BMI) OF 45.0 TO 49.9 IN ADULT, UNSPECIFIED OBESITY TYPE: ICD-10-CM

## 2025-02-17 LAB
ALBUMIN SERPL-MCNC: 4.1 G/DL (ref 3.6–5.1)
ALP SERPL-CCNC: 111 U/L (ref 37–153)
ALT SERPL-CCNC: 17 U/L (ref 6–29)
ANION GAP SERPL CALCULATED.4IONS-SCNC: 10 MMOL/L (CALC) (ref 7–17)
AST SERPL-CCNC: 16 U/L (ref 10–35)
BASOPHILS # BLD AUTO: 64 CELLS/UL (ref 0–200)
BASOPHILS NFR BLD AUTO: 0.8 %
BILIRUB SERPL-MCNC: 0.3 MG/DL (ref 0.2–1.2)
BUN SERPL-MCNC: 19 MG/DL (ref 7–25)
CALCIUM SERPL-MCNC: 8.9 MG/DL (ref 8.6–10.4)
CHLORIDE SERPL-SCNC: 106 MMOL/L (ref 98–110)
CO2 SERPL-SCNC: 23 MMOL/L (ref 20–32)
CREAT SERPL-MCNC: 0.77 MG/DL (ref 0.5–1.03)
EGFRCR SERPLBLD CKD-EPI 2021: 92 ML/MIN/1.73M2
EOSINOPHIL # BLD AUTO: 432 CELLS/UL (ref 15–500)
EOSINOPHIL NFR BLD AUTO: 5.4 %
ERYTHROCYTE [DISTWIDTH] IN BLOOD BY AUTOMATED COUNT: 13.2 % (ref 11–15)
EST. AVERAGE GLUCOSE BLD GHB EST-MCNC: 114 MG/DL
EST. AVERAGE GLUCOSE BLD GHB EST-SCNC: 6.3 MMOL/L
GLUCOSE SERPL-MCNC: 91 MG/DL (ref 65–99)
HBA1C MFR BLD: 5.6 % OF TOTAL HGB
HCT VFR BLD AUTO: 40.8 % (ref 35–45)
HGB BLD-MCNC: 12.8 G/DL (ref 11.7–15.5)
LYMPHOCYTES # BLD AUTO: 2112 CELLS/UL (ref 850–3900)
LYMPHOCYTES NFR BLD AUTO: 26.4 %
MCH RBC QN AUTO: 26.9 PG (ref 27–33)
MCHC RBC AUTO-ENTMCNC: 31.4 G/DL (ref 32–36)
MCV RBC AUTO: 85.9 FL (ref 80–100)
MONOCYTES # BLD AUTO: 664 CELLS/UL (ref 200–950)
MONOCYTES NFR BLD AUTO: 8.3 %
NEUTROPHILS # BLD AUTO: 4728 CELLS/UL (ref 1500–7800)
NEUTROPHILS NFR BLD AUTO: 59.1 %
PLATELET # BLD AUTO: 279 THOUSAND/UL (ref 140–400)
PMV BLD REES-ECKER: 13.6 FL (ref 7.5–12.5)
POTASSIUM SERPL-SCNC: 4.1 MMOL/L (ref 3.5–5.3)
PROT SERPL-MCNC: 6.8 G/DL (ref 6.1–8.1)
RBC # BLD AUTO: 4.75 MILLION/UL (ref 3.8–5.1)
SODIUM SERPL-SCNC: 139 MMOL/L (ref 135–146)
TSH SERPL-ACNC: 1.65 MIU/L
WBC # BLD AUTO: 8 THOUSAND/UL (ref 3.8–10.8)

## 2025-02-17 RX ORDER — SEMAGLUTIDE 2.4 MG/.75ML
2.4 INJECTION, SOLUTION SUBCUTANEOUS
Qty: 3 ML | Refills: 3 | Status: SHIPPED | OUTPATIENT
Start: 2025-02-23 | End: 2025-06-09

## 2025-02-21 ENCOUNTER — APPOINTMENT (OUTPATIENT)
Dept: PRIMARY CARE | Facility: CLINIC | Age: 55
End: 2025-02-21
Payer: COMMERCIAL

## 2025-02-21 VITALS
DIASTOLIC BLOOD PRESSURE: 86 MMHG | HEART RATE: 69 BPM | OXYGEN SATURATION: 92 % | SYSTOLIC BLOOD PRESSURE: 125 MMHG | BODY MASS INDEX: 44.08 KG/M2 | WEIGHT: 258.8 LBS | TEMPERATURE: 97.6 F

## 2025-02-21 DIAGNOSIS — E66.01 CLASS 3 SEVERE OBESITY WITHOUT SERIOUS COMORBIDITY WITH BODY MASS INDEX (BMI) OF 45.0 TO 49.9 IN ADULT, UNSPECIFIED OBESITY TYPE: ICD-10-CM

## 2025-02-21 DIAGNOSIS — E03.9 HYPOTHYROIDISM, UNSPECIFIED TYPE: ICD-10-CM

## 2025-02-21 DIAGNOSIS — E66.813 CLASS 3 SEVERE OBESITY WITHOUT SERIOUS COMORBIDITY WITH BODY MASS INDEX (BMI) OF 45.0 TO 49.9 IN ADULT, UNSPECIFIED OBESITY TYPE: ICD-10-CM

## 2025-02-21 DIAGNOSIS — K21.9 GASTROESOPHAGEAL REFLUX DISEASE WITHOUT ESOPHAGITIS: ICD-10-CM

## 2025-02-21 DIAGNOSIS — R73.03 PREDIABETES: Primary | ICD-10-CM

## 2025-02-21 PROCEDURE — 1036F TOBACCO NON-USER: CPT | Performed by: FAMILY MEDICINE

## 2025-02-21 PROCEDURE — 99214 OFFICE O/P EST MOD 30 MIN: CPT | Performed by: FAMILY MEDICINE

## 2025-02-21 NOTE — PROGRESS NOTES
Subjective   Patient ID: Joanne Weir is a 54 y.o. female who presents for Follow-up (6 month follow up/).    HPI   Overall doing very well  Starting weight 281  Current weight 258  She is up to 2.4 g weekly of Wegovy and feels her weight loss has stalled a bit  Minimal side effects  She continues to work on healthy diet and exercise in conjunction with medication  No new concerns    Hypothyroidism-taking levothyroxine as directed without any issues  Review of Systems  As noted HPI  Objective   /86 (BP Location: Right arm, Patient Position: Sitting, BP Cuff Size: Adult)   Pulse 69   Temp 36.4 °C (97.6 °F) (Temporal)   Wt 117 kg (258 lb 12.8 oz)   SpO2 92%   BMI 44.08 kg/m²     Physical Exam  Vitals and nursing note reviewed.   Constitutional:       Appearance: Normal appearance. She is obese.   HENT:      Head: Normocephalic and atraumatic.      Right Ear: External ear normal.      Left Ear: External ear normal.      Mouth/Throat:      Mouth: Mucous membranes are moist.   Eyes:      Conjunctiva/sclera: Conjunctivae normal.   Cardiovascular:      Rate and Rhythm: Normal rate and regular rhythm.      Heart sounds: Normal heart sounds.   Pulmonary:      Effort: Pulmonary effort is normal.      Breath sounds: Normal breath sounds.   Musculoskeletal:         General: No swelling.      Cervical back: Normal range of motion and neck supple.   Skin:     General: Skin is warm and dry.      Capillary Refill: Capillary refill takes less than 2 seconds.   Neurological:      General: No focal deficit present.      Mental Status: She is alert. Mental status is at baseline.   Psychiatric:         Mood and Affect: Mood normal.         Behavior: Behavior normal.         Thought Content: Thought content normal.         Judgment: Judgment normal.         Assessment/Plan   1. Prediabetes  Referral to Clinical Pharmacy      2. Gastroesophageal reflux disease without esophagitis        3. Hypothyroidism, unspecified type         4. Class 3 severe obesity without serious comorbidity with body mass index (BMI) of 45.0 to 49.9 in adult, unspecified obesity type  Referral to Clinical Pharmacy        Reviewed recent labs which show significantly improved numbers.  Her weight loss has significantly slowed/stalled so I think exploring Zepbound/Mounjaro next best step.  We will get her in touch with clinical pharmacist to move through this transition    Patient verbalized understanding of plan of care and all questions were answered.         Karson Mack, DO

## 2025-02-25 ENCOUNTER — APPOINTMENT (OUTPATIENT)
Dept: PHARMACY | Facility: HOSPITAL | Age: 55
End: 2025-02-25
Payer: COMMERCIAL

## 2025-02-25 DIAGNOSIS — R73.03 PREDIABETES: ICD-10-CM

## 2025-02-25 DIAGNOSIS — E66.01 CLASS 3 SEVERE OBESITY WITHOUT SERIOUS COMORBIDITY WITH BODY MASS INDEX (BMI) OF 45.0 TO 49.9 IN ADULT, UNSPECIFIED OBESITY TYPE: ICD-10-CM

## 2025-02-25 DIAGNOSIS — E66.813 CLASS 3 SEVERE OBESITY WITHOUT SERIOUS COMORBIDITY WITH BODY MASS INDEX (BMI) OF 45.0 TO 49.9 IN ADULT, UNSPECIFIED OBESITY TYPE: ICD-10-CM

## 2025-02-25 NOTE — PROGRESS NOTES
Clinical Pharmacy Appointment    Patient ID: Joanne Weir is a 54 y.o. female who presents for Weight Management.    Pt is here for First appointment.     Referring Provider: Karson Mack DO  PCP: Karson Mack DO   Last visit with PCP: 2/21/2025   Next visit with PCP: 8/22/2025      Subjective     HPI  WEIGHT MANAGEMENT  PMH significant for prediabetes  Special needs/barriers to therapy: none at this time    BMI Readings from Last 1 Encounters:   02/21/25 44.08 kg/m²     Lab Results   Component Value Date    HGBA1C 5.6 02/17/2025    GLUCOSE 91 02/17/2025     Lifestyle  Diet: 3 meals/day, rarely snacks with Wegovy  Breakfast: croissant sandwich or bagel  Lunch: chicken, salad, or soup  Dinner: variable  Has worked with nutritionist/dietician? No  Physical Activity: walking   Alcohol Use: 3 drinks per week    Non-Pharmacological Therapy  Weight loss techniques attempted:  Self-directed dieting: reduced calorie diets    Pharmacological Therapy  Current Medications:   Wegovy 2.4 mg once weekly     Patient started on Wegovy approximately one year ago and was titrated up to 2.4 mg dose in July 2024. She endorses occasional nausea, but no significant side effects. She is having regular bowel movements.    Previous Medications: none for weight loss     Weight Loss  Starting weight 281 pounds at office visit 2/9/2024 (prior to GLP1)  Current weight 258 pounds at office visit 2/21/2025 (on Wegovy 2.4 mg)    Pertinent PMH Review  PMH of Pancreatitis: No  PMH of Gallbladder Disease: No  PMH of Retinopathy: No  PMH of MTC: No    Preventative Care  ASCVD Risk:   The 10-year ASCVD risk score (Chandu PARRY, et al., 2019) is: 1%    Values used to calculate the score:      Age: 54 years      Sex: Female      Is Non- : No      Diabetic: No      Tobacco smoker: No      Systolic Blood Pressure: 125 mmHg      Is BP treated: No      HDL Cholesterol: 58 mg/dL      Total Cholesterol: 141 mg/dL  On Statin:  "No      Medication System Management  Patient's preferred pharmacy: [facility] in [place], [place]  Adherence/Organization: no concerns identified  Affordability/Accessibility: no concerns at this time  Insurance coverage of weight-loss medications? Yes  Wegovy prior authorization approved through 3/28/2025  Cost is higher in the beginning of the year until deductible is met  Eligible for copay cards/programs? Yes      Objective   Allergies   Allergen Reactions    Penicillins Hives, Itching and Rash     Social History     Social History Narrative    Not on file      Medication Review  Current Outpatient Medications   Medication Instructions    levothyroxine (SYNTHROID, LEVOXYL) 75 mcg, oral, Daily    omeprazole (PriLOSEC) 20 mg DR capsule Take by mouth.    Wegovy 2.4 mg, subcutaneous, Once Weekly      Vitals  BP Readings from Last 2 Encounters:   02/21/25 125/86   11/22/24 124/85     BMI Readings from Last 1 Encounters:   02/21/25 44.08 kg/m²      Labs  A1C  Lab Results   Component Value Date    HGBA1C 5.6 02/17/2025    HGBA1C 5.7 (H) 08/20/2024    HGBA1C 6.0 (H) 01/24/2024     BMP  Lab Results   Component Value Date    CALCIUM 8.9 02/17/2025     02/17/2025    K 4.1 02/17/2025    CO2 23 02/17/2025     02/17/2025    BUN 19 02/17/2025    CREATININE 0.77 02/17/2025    EGFR 92 02/17/2025     LFTs  Lab Results   Component Value Date    ALT 17 02/17/2025    AST 16 02/17/2025    ALKPHOS 111 02/17/2025    BILITOT 0.3 02/17/2025     FLP  Lab Results   Component Value Date    TRIG 77 08/20/2024    CHOL 141 08/20/2024    LDLF 72 04/17/2023    LDLCALC 68 08/20/2024    HDL 58.0 08/20/2024     Urine Microalbumin  No results found for: \"MICROALBCREA\"  Weight Management  Wt Readings from Last 3 Encounters:   02/21/25 117 kg (258 lb 12.8 oz)   11/22/24 117 kg (259 lb)   08/09/24 120 kg (263 lb 12.8 oz)      There is no height or weight on file to calculate BMI.     Assessment/Plan   Problem List Items Addressed This Visit  "      Prediabetes     Other Visit Diagnoses       Class 3 severe obesity without serious comorbidity with body mass index (BMI) of 45.0 to 49.9 in adult, unspecified obesity type                DISCUSSION/PLAN:  Patient currently on Wegovy 2.4 mg for weight loss and prediabetes, but progress has slowed. Recommend transitioning to Zepbound for improved weight loss potential. Will likely start Zepbound at 5 mg dose as patient has been stable on max dose of Wegovy for more than 6 months without significant side effects. Plan to submit Zepbound prior authorization and follow up with patient in approximately 1-2 weeks.    CONTINUE  Wegovy 2.4 mg under the skin once weekly on Sundays for now    Future Considerations:  Transition to Zepbound    Monitoring and Education:  Counseled on GLP1/GIP mechanism of action, benefits, side effects, monitoring, and potential complications  Answered all patient questions    Continue all meds under the continuation of care with the referring provider and clinical pharmacy team.    Clinical Pharmacist follow-up: within 1-2 weeks, pending Zepbound prior authorization  Orders placed: none at this time    Thank you,   Nadia Regalado, PharmD  Clinical Pharmacy Specialist, Primary Care   [phone]    Verbal consent to manage patient's drug therapy was obtained from the patient . Patient was informed she may decline to participate or withdraw from participation in pharmacy services at any time.

## 2025-03-14 ENCOUNTER — TELEMEDICINE (OUTPATIENT)
Dept: PHARMACY | Facility: HOSPITAL | Age: 55
End: 2025-03-14
Payer: COMMERCIAL

## 2025-03-14 DIAGNOSIS — E66.813 CLASS 3 SEVERE OBESITY WITHOUT SERIOUS COMORBIDITY WITH BODY MASS INDEX (BMI) OF 45.0 TO 49.9 IN ADULT, UNSPECIFIED OBESITY TYPE: ICD-10-CM

## 2025-03-14 DIAGNOSIS — R73.03 PREDIABETES: ICD-10-CM

## 2025-03-14 DIAGNOSIS — R73.03 PREDIABETES: Primary | ICD-10-CM

## 2025-03-14 DIAGNOSIS — E66.01 CLASS 3 SEVERE OBESITY WITHOUT SERIOUS COMORBIDITY WITH BODY MASS INDEX (BMI) OF 45.0 TO 49.9 IN ADULT, UNSPECIFIED OBESITY TYPE: ICD-10-CM

## 2025-03-14 PROCEDURE — RXMED WILLOW AMBULATORY MEDICATION CHARGE

## 2025-03-14 NOTE — PROGRESS NOTES
Clinical Pharmacy Appointment    Patient ID: Joanne Weir is a 54 y.o. female who presents for Weight Management.    Pt is here for Follow Up appointment.     Referring Provider: Karson Mack DO  PCP: Karson Mack DO   Last visit with PCP: 2/21/2025   Next visit with PCP: 8/22/2025      Subjective     HPI  WEIGHT MANAGEMENT  PMH significant for prediabetes  Special needs/barriers to therapy: none at this time    BMI Readings from Last 1 Encounters:   02/21/25 44.08 kg/m²     Lab Results   Component Value Date    HGBA1C 5.6 02/17/2025    GLUCOSE 91 02/17/2025     Lifestyle  Diet: 3 meals/day, rarely snacks with Wegovy  Breakfast: croissant sandwich or bagel  Lunch: chicken, salad, or soup  Dinner: variable  Has worked with nutritionist/dietician? No  Physical Activity: walking   Alcohol Use: 3 drinks per week    Non-Pharmacological Therapy  Weight loss techniques attempted:  Self-directed dieting: reduced calorie diets    Pharmacological Therapy  Current Medications:   Wegovy 2.4 mg once weekly     Patient started on Wegovy approximately one year ago and was titrated up to 2.4 mg dose in July 2024. She endorses occasional nausea, but no significant side effects. She is having regular bowel movements.    Previous Medications: none for weight loss     Weight Loss  Starting weight 281 pounds at office visit 2/9/2024 (prior to GLP1)  Current weight 258 pounds at office visit 2/21/2025 (on Wegovy 2.4 mg)    Pertinent PMH Review  PMH of Pancreatitis: No  PMH of Gallbladder Disease: No  PMH of Retinopathy: No  PMH of MTC: No    Preventative Care  ASCVD Risk:   The 10-year ASCVD risk score (Chandu PARRY, et al., 2019) is: 1%    Values used to calculate the score:      Age: 54 years      Sex: Female      Is Non- : No      Diabetic: No      Tobacco smoker: No      Systolic Blood Pressure: 125 mmHg      Is BP treated: No      HDL Cholesterol: 58 mg/dL      Total Cholesterol: 141 mg/dL  On  "Statin: No      Medication System Management  Patient's preferred pharmacy: [facility] in [place], [place]  Adherence/Organization: no concerns identified  Affordability/Accessibility: no concerns at this time  Insurance coverage of weight-loss medications? Yes  Wegovy prior authorization approved through 3/28/2025  Zepbound prior authorization approved through 3/2/2026  Cost is higher in the beginning of the year until deductible is met  Eligible for copay cards/programs? Yes      Objective   Allergies   Allergen Reactions    Penicillins Hives, Itching and Rash     Social History     Social History Narrative    Not on file      Medication Review  Current Outpatient Medications   Medication Instructions    levothyroxine (SYNTHROID, LEVOXYL) 75 mcg, oral, Daily    omeprazole (PriLOSEC) 20 mg DR capsule Take by mouth.    tirzepatide (weight loss) (ZEPBOUND) 5 mg, subcutaneous, Every 7 days      Vitals  BP Readings from Last 2 Encounters:   02/21/25 125/86   11/22/24 124/85     BMI Readings from Last 1 Encounters:   02/21/25 44.08 kg/m²      Labs  A1C  Lab Results   Component Value Date    HGBA1C 5.6 02/17/2025    HGBA1C 5.7 (H) 08/20/2024    HGBA1C 6.0 (H) 01/24/2024     BMP  Lab Results   Component Value Date    CALCIUM 8.9 02/17/2025     02/17/2025    K 4.1 02/17/2025    CO2 23 02/17/2025     02/17/2025    BUN 19 02/17/2025    CREATININE 0.77 02/17/2025    EGFR 92 02/17/2025     LFTs  Lab Results   Component Value Date    ALT 17 02/17/2025    AST 16 02/17/2025    ALKPHOS 111 02/17/2025    BILITOT 0.3 02/17/2025     FLP  Lab Results   Component Value Date    TRIG 77 08/20/2024    CHOL 141 08/20/2024    LDLF 72 04/17/2023    LDLCALC 68 08/20/2024    HDL 58.0 08/20/2024     Urine Microalbumin  No results found for: \"MICROALBCREA\"  Weight Management  Wt Readings from Last 3 Encounters:   02/21/25 117 kg (258 lb 12.8 oz)   11/22/24 117 kg (259 lb)   08/09/24 120 kg (263 lb 12.8 oz)      There is no height or " weight on file to calculate BMI.     Assessment/Plan   Problem List Items Addressed This Visit       Prediabetes    Relevant Medications    tirzepatide, weight loss, (Zepbound) 5 mg/0.5 mL injection     Other Visit Diagnoses       Class 3 severe obesity without serious comorbidity with body mass index (BMI) of 45.0 to 49.9 in adult, unspecified obesity type        Relevant Medications    tirzepatide, weight loss, (Zepbound) 5 mg/0.5 mL injection            DISCUSSION/PLAN:  Patient currently on Wegovy 2.4 mg for weight loss and prediabetes, but progress has slowed. Previously discussed transitioning to Zepbound, and prior authorization has been approved. Reviewed cost/remaining deductible and administration. Will start Zepbound at 5 mg dose as patient has been stable on max dose of Wegovy for more than 6 months without significant side effects. Follow up in 4 weeks or sooner if needed.    STOP  Wegovy  START  Zepbound 5 mg under the skin once weekly on Sundays    Future Considerations:  Zepbound dose titration    Monitoring and Education:  Counseled on Zepbound mechanism of action, storage, side effects, and monitoring  Detailed education on Zepbound administration to ensure correct technique  Answered all patient questions    Continue all meds under the continuation of care with the referring provider and clinical pharmacy team.    Clinical Pharmacist follow-up: April 10th, 2025 at 11:20 AM  Orders placed: Zepbound 5 mg to  [facility] for home delivery    Thank you,   Nadia Regalado, PharmD  Clinical Pharmacy Specialist, Primary Care   [phone]    Verbal consent to manage patient's drug therapy was obtained from the patient . Patient was informed she may decline to participate or withdraw from participation in pharmacy services at any time.

## 2025-03-18 ENCOUNTER — PHARMACY VISIT (OUTPATIENT)
Dept: PHARMACY | Facility: CLINIC | Age: 55
End: 2025-03-18
Payer: COMMERCIAL

## 2025-04-10 ENCOUNTER — APPOINTMENT (OUTPATIENT)
Dept: PHARMACY | Facility: HOSPITAL | Age: 55
End: 2025-04-10
Payer: COMMERCIAL

## 2025-04-10 DIAGNOSIS — E66.813 CLASS 3 SEVERE OBESITY WITHOUT SERIOUS COMORBIDITY WITH BODY MASS INDEX (BMI) OF 45.0 TO 49.9 IN ADULT, UNSPECIFIED OBESITY TYPE: ICD-10-CM

## 2025-04-10 DIAGNOSIS — E66.01 CLASS 3 SEVERE OBESITY WITHOUT SERIOUS COMORBIDITY WITH BODY MASS INDEX (BMI) OF 45.0 TO 49.9 IN ADULT, UNSPECIFIED OBESITY TYPE: ICD-10-CM

## 2025-04-10 DIAGNOSIS — R73.03 PREDIABETES: ICD-10-CM

## 2025-04-10 PROCEDURE — RXMED WILLOW AMBULATORY MEDICATION CHARGE

## 2025-04-10 NOTE — PROGRESS NOTES
Clinical Pharmacy Appointment    Patient ID: Joanne Weir is a 54 y.o. female who presents for Weight Management.    Pt is here for Follow Up appointment.     Referring Provider: Karson Mack DO  PCP: Karson Mack DO   Last visit with PCP: 2/21/2025   Next visit with PCP: 8/22/2025    INTERVAL HISTORY   Patient's last appointment with clinical pharmacy team on 3/14/2025  Recent hospitalizations? No   Medication changes? No   Missed doses of medications? No   Side effects? No   Updated relevant labs? No   Changes to diet or exercise regimen? Yes   Appetite is more controlled since transitioning from Wegovy to Zepbound      Subjective     HPI  WEIGHT MANAGEMENT  PMH significant for prediabetes  Special needs/barriers to therapy: none at this time    BMI Readings from Last 1 Encounters:   02/21/25 44.08 kg/m²     Lab Results   Component Value Date    HGBA1C 5.6 02/17/2025    GLUCOSE 91 02/17/2025     Lifestyle  Diet: 3 meals/day, rarely snacks with Wegovy  Breakfast: croissant sandwich or bagel  Lunch: chicken, salad, or soup  Dinner: variable  Has worked with nutritionist/dietician? No  Physical Activity: walking   Alcohol Use: 3 drinks per week    Non-Pharmacological Therapy  Weight loss techniques attempted:  Self-directed dieting: reduced calorie diets    Pharmacological Therapy  Current Medications:   Zepbound 5 mg once weekly    Previous Medications:   Wegovy - titrated up to 2.4 mg, switched to Zepbound    Weight Loss  Starting weight 281 pounds at office visit 2/9/2024 (prior to GLP1)  Previous weight 258 pounds at office visit 2/21/2025 (on Wegovy 2.4 mg)  Current weight 253 pounds on home scale 4/10/2025 (on Zepbound 5 mg)    Pertinent PMH Review  PMH of Pancreatitis: No  PMH of Gallbladder Disease: No  PMH of Retinopathy: No  PMH of MTC: No    Preventative Care  ASCVD Risk:   The 10-year ASCVD risk score (Chandu PARRY, et al., 2019) is: 1%    Values used to calculate the score:      Age: 54 years     "  Sex: Female      Is Non- : No      Diabetic: No      Tobacco smoker: No      Systolic Blood Pressure: 125 mmHg      Is BP treated: No      HDL Cholesterol: 58 mg/dL      Total Cholesterol: 141 mg/dL  On Statin: No      Medication System Management  Patient's preferred pharmacy: [facility] in [place], [place]  Adherence/Organization: no concerns identified  Affordability/Accessibility: no concerns at this time  Insurance coverage of weight-loss medications? Yes  Zepbound prior authorization approved through 3/2/2026  Cost is higher in the beginning of the year until deductible is met  Eligible for copay cards/programs? Yes      Objective   Allergies   Allergen Reactions    Penicillins Hives, Itching and Rash     Social History     Social History Narrative    Not on file      Medication Review  Current Outpatient Medications   Medication Instructions    levothyroxine (SYNTHROID, LEVOXYL) 75 mcg, oral, Daily    omeprazole (PriLOSEC) 20 mg DR capsule Take by mouth.    tirzepatide (weight loss) (ZEPBOUND) 7.5 mg, subcutaneous, Every 7 days      Vitals  BP Readings from Last 2 Encounters:   02/21/25 125/86   11/22/24 124/85     BMI Readings from Last 1 Encounters:   02/21/25 44.08 kg/m²      Labs  A1C  Lab Results   Component Value Date    HGBA1C 5.6 02/17/2025    HGBA1C 5.7 (H) 08/20/2024    HGBA1C 6.0 (H) 01/24/2024     BMP  Lab Results   Component Value Date    CALCIUM 8.9 02/17/2025     02/17/2025    K 4.1 02/17/2025    CO2 23 02/17/2025     02/17/2025    BUN 19 02/17/2025    CREATININE 0.77 02/17/2025    EGFR 92 02/17/2025     LFTs  Lab Results   Component Value Date    ALT 17 02/17/2025    AST 16 02/17/2025    ALKPHOS 111 02/17/2025    BILITOT 0.3 02/17/2025     FLP  Lab Results   Component Value Date    TRIG 77 08/20/2024    CHOL 141 08/20/2024    LDLF 72 04/17/2023    LDLCALC 68 08/20/2024    HDL 58.0 08/20/2024     Urine Microalbumin  No results found for: \"MICROALBCREA\"  Weight " Management  Wt Readings from Last 3 Encounters:   02/21/25 117 kg (258 lb 12.8 oz)   11/22/24 117 kg (259 lb)   08/09/24 120 kg (263 lb 12.8 oz)      There is no height or weight on file to calculate BMI.     Assessment/Plan   Problem List Items Addressed This Visit       Prediabetes    Relevant Medications    tirzepatide, weight loss, (Zepbound) 7.5 mg/0.5 mL injection    Other Relevant Orders    Referral to Clinical Pharmacy     Other Visit Diagnoses       Class 3 severe obesity without serious comorbidity with body mass index (BMI) of 45.0 to 49.9 in adult, unspecified obesity type        Relevant Medications    tirzepatide, weight loss, (Zepbound) 7.5 mg/0.5 mL injection    Other Relevant Orders    Referral to Clinical Pharmacy            DISCUSSION/PLAN:  Patient switched from Wegovy to Zepbound in March 2025 for improved weight loss potential. She denies significant side effects or missed doses at this time. Appetite is more controlled since transitioning to Zepbound, and patient has lost 3 pounds over the last 3 weeks. She feels she has more energy. Will increase Zepbound dose to 7.5 mg as she is tolerating well. Follow up in 4 weeks or sooner if needed.    INCREASE  Zepbound to 7.5 mg under the skin once weekly on Sundays    Future Considerations:  Zepbound dose titration    Monitoring and Education:  Counseled on Zepbound mechanism of action, storage, side effects, and monitoring  Reviewed Zepbound administration and dose titration schedule  Answered all patient questions    Continue all meds under the continuation of care with the referring provider and clinical pharmacy team.    Clinical Pharmacist follow-up: May 8th, 2025 at 11:20 AM  Orders placed: Zepbound 7.5 mg to  [facility] for home delivery    Thank you,   Nadia Regalado, PharmD  Clinical Pharmacy Specialist, Primary Care   [phone]    Verbal consent to manage patient's drug therapy was obtained from the patient . Patient was informed she may  decline to participate or withdraw from participation in pharmacy services at any time.

## 2025-04-14 ENCOUNTER — PHARMACY VISIT (OUTPATIENT)
Dept: PHARMACY | Facility: CLINIC | Age: 55
End: 2025-04-14
Payer: COMMERCIAL

## 2025-05-08 ENCOUNTER — APPOINTMENT (OUTPATIENT)
Dept: PHARMACY | Facility: HOSPITAL | Age: 55
End: 2025-05-08
Payer: COMMERCIAL

## 2025-05-08 DIAGNOSIS — E66.813 CLASS 3 SEVERE OBESITY WITHOUT SERIOUS COMORBIDITY WITH BODY MASS INDEX (BMI) OF 45.0 TO 49.9 IN ADULT, UNSPECIFIED OBESITY TYPE: ICD-10-CM

## 2025-05-08 DIAGNOSIS — R73.03 PREDIABETES: ICD-10-CM

## 2025-05-08 NOTE — PROGRESS NOTES
Clinical Pharmacy Appointment    Patient ID: Joanne Weir is a 54 y.o. female who presents for Weight Management.    Pt is here for Follow Up appointment.     Referring Provider: Karson Mack DO  PCP: Karson Mack DO   Last visit with PCP: 2/21/2025   Next visit with PCP: 8/22/2025    INTERVAL HISTORY   Patient's last appointment with clinical pharmacy team on 4/10/2025  Recent hospitalizations? No   Medication changes? No   Missed doses of medications? No   Side effects? No   Updated relevant labs? No   Changes to diet or exercise regimen? Yes   Patient has been exercising more - walking and treadmill      Subjective     HPI  WEIGHT MANAGEMENT  PMH significant for prediabetes  Special needs/barriers to therapy: none at this time    BMI Readings from Last 1 Encounters:   02/21/25 44.08 kg/m²     Lab Results   Component Value Date    HGBA1C 5.6 02/17/2025    GLUCOSE 91 02/17/2025     Lifestyle  Diet: 3 meals/day, rarely snacks with Wegovy  Breakfast: croissant sandwich or bagel  Lunch: chicken, salad, or soup  Dinner: variable  Has worked with nutritionist/dietician? No  Physical Activity: walking   Alcohol Use: 3 drinks per week    Non-Pharmacological Therapy  Weight loss techniques attempted:  Self-directed dieting: reduced calorie diets    Pharmacological Therapy  Current Medications:   Zepbound 7.5 mg once weekly    Previous Medications:   Wegovy - titrated up to 2.4 mg, switched to Zepbound    Weight Loss  Starting weight 281 pounds at office visit 2/9/2024 (prior to GLP1)  Previous weight 258 pounds at office visit 2/21/2025 (on Wegovy 2.4 mg)  Previous weight 253 pounds on home scale 4/10/2025 (on Zepbound 5 mg)  Current weight 259 pounds on home scale 5/8/2025 (on Zepbound 7.5 mg)    Pertinent PMH Review  PMH of Pancreatitis: No  PMH of Gallbladder Disease: No  PMH of Retinopathy: No  PMH of MTC: No    Preventative Care  ASCVD Risk:   The 10-year ASCVD risk score (Chandu PARRY, et al., 2019) is: 1%     Values used to calculate the score:      Age: 54 years      Sex: Female      Is Non- : No      Diabetic: No      Tobacco smoker: No      Systolic Blood Pressure: 125 mmHg      Is BP treated: No      HDL Cholesterol: 58 mg/dL      Total Cholesterol: 141 mg/dL  On Statin: No      Medication System Management  Patient's preferred pharmacy: [facility] in [place], [place]  Adherence/Organization: no concerns identified  Affordability/Accessibility: no concerns at this time  Insurance coverage of weight-loss medications? Yes  Zepbound prior authorization approved through 3/2/2026  Cost is higher in the beginning of the year until deductible is met  Eligible for copay cards/programs? Yes      Objective   Allergies   Allergen Reactions    Penicillins Hives, Itching and Rash     Social History     Social History Narrative    Not on file      Medication Review  Current Outpatient Medications   Medication Instructions    levothyroxine (SYNTHROID, LEVOXYL) 75 mcg, oral, Daily    omeprazole (PriLOSEC) 20 mg DR capsule Take by mouth.    tirzepatide (weight loss) (ZEPBOUND) 10 mg, subcutaneous, Every 7 days      Vitals  BP Readings from Last 2 Encounters:   02/21/25 125/86   11/22/24 124/85     BMI Readings from Last 1 Encounters:   02/21/25 44.08 kg/m²      Labs  A1C  Lab Results   Component Value Date    HGBA1C 5.6 02/17/2025    HGBA1C 5.7 (H) 08/20/2024    HGBA1C 6.0 (H) 01/24/2024     BMP  Lab Results   Component Value Date    CALCIUM 8.9 02/17/2025     02/17/2025    K 4.1 02/17/2025    CO2 23 02/17/2025     02/17/2025    BUN 19 02/17/2025    CREATININE 0.77 02/17/2025    EGFR 92 02/17/2025     LFTs  Lab Results   Component Value Date    ALT 17 02/17/2025    AST 16 02/17/2025    ALKPHOS 111 02/17/2025    BILITOT 0.3 02/17/2025     FLP  Lab Results   Component Value Date    TRIG 77 08/20/2024    CHOL 141 08/20/2024    LDLF 72 04/17/2023    LDLCALC 68 08/20/2024    HDL 58.0 08/20/2024     Urine  "Microalbumin  No results found for: \"MICROALBCREA\"  Weight Management  Wt Readings from Last 3 Encounters:   02/21/25 117 kg (258 lb 12.8 oz)   11/22/24 117 kg (259 lb)   08/09/24 120 kg (263 lb 12.8 oz)      There is no height or weight on file to calculate BMI.     Assessment/Plan   Problem List Items Addressed This Visit       Prediabetes    Relevant Medications    tirzepatide, weight loss, (Zepbound) 10 mg/0.5 mL injection    Other Relevant Orders    Referral to Clinical Pharmacy     Other Visit Diagnoses         Class 3 severe obesity without serious comorbidity with body mass index (BMI) of 45.0 to 49.9 in adult, unspecified obesity type        Relevant Medications    tirzepatide, weight loss, (Zepbound) 10 mg/0.5 mL injection    Other Relevant Orders    Referral to Clinical Pharmacy            DISCUSSION/PLAN:  Patient switched from Wegovy to Zepbound in March 2025 for improved weight loss potential. She denies significant side effects or missed doses at this time. Patient has been exercising more this past month, but she has also noticed more hunger and weight gain. Will increase Zepbound dose to 10 mg to help with appetite control. Follow up in 4 weeks or sooner if needed.    INCREASE  Zepbound to 10 mg under the skin once weekly on Sundays    Future Considerations:  Zepbound dose titration    Monitoring and Education:  Counseled on Zepbound mechanism of action, storage, side effects, and monitoring  Reviewed Zepbound administration and dose titration schedule  Answered all patient questions    Continue all meds under the continuation of care with the referring provider and clinical pharmacy team.    Clinical Pharmacist follow-up: June 5th, 2025 at 10:20 AM  Orders placed: Zepbound 10 mg to  [facility] for home delivery    Thank you,   Nadia Regalado, PharmD  Clinical Pharmacy Specialist, Primary Care   [phone]    Verbal consent to manage patient's drug therapy was obtained from the patient . Patient " was informed she may decline to participate or withdraw from participation in pharmacy services at any time.

## 2025-05-09 ENCOUNTER — PHARMACY VISIT (OUTPATIENT)
Dept: PHARMACY | Facility: CLINIC | Age: 55
End: 2025-05-09
Payer: COMMERCIAL

## 2025-05-09 PROCEDURE — RXMED WILLOW AMBULATORY MEDICATION CHARGE

## 2025-06-05 ENCOUNTER — APPOINTMENT (OUTPATIENT)
Dept: PHARMACY | Facility: HOSPITAL | Age: 55
End: 2025-06-05
Payer: COMMERCIAL

## 2025-06-05 DIAGNOSIS — R73.03 PREDIABETES: ICD-10-CM

## 2025-06-05 DIAGNOSIS — E66.813 CLASS 3 SEVERE OBESITY WITHOUT SERIOUS COMORBIDITY WITH BODY MASS INDEX (BMI) OF 45.0 TO 49.9 IN ADULT, UNSPECIFIED OBESITY TYPE: ICD-10-CM

## 2025-06-05 PROCEDURE — RXMED WILLOW AMBULATORY MEDICATION CHARGE

## 2025-06-05 NOTE — PROGRESS NOTES
Clinical Pharmacy Appointment    Patient ID: Joanne Weir is a 54 y.o. female who presents for Weight Management.    Pt is here for Follow Up appointment.     Referring Provider: Karson Mack DO  PCP: Karson Mack DO   Last visit with PCP: 2/21/2025   Next visit with PCP: 8/22/2025    INTERVAL HISTORY   Patient's last appointment with clinical pharmacy team on 5/8/2025  Recent hospitalizations? No   Medication changes? No   Missed doses of medications? No   Side effects? Yes  Patient reports more nausea and a few episodes of vomiting with increased Zepbound dose  Updated relevant labs? No   Changes to diet or exercise regimen? Yes   She has been getting outside more with nicer weather - more walking, gardening      Subjective     HPI  WEIGHT MANAGEMENT  PMH significant for prediabetes  Special needs/barriers to therapy: none at this time    BMI Readings from Last 1 Encounters:   02/21/25 44.08 kg/m²     Lab Results   Component Value Date    HGBA1C 5.6 02/17/2025    GLUCOSE 91 02/17/2025     Lifestyle  Diet: 3 meals/day, rarely snacks with Wegovy  Breakfast: croissant sandwich or bagel  Lunch: chicken, salad, or soup  Dinner: variable  Has worked with nutritionist/dietician? No  Physical Activity: walking   Alcohol Use: 3 drinks per week    Non-Pharmacological Therapy  Weight loss techniques attempted:  Self-directed dieting: reduced calorie diets    Pharmacological Therapy  Current Medications:   Zepbound 10 mg once weekly    Previous Medications:   Wegovy - titrated up to 2.4 mg, switched to Zepbound    Weight Loss  Starting weight 281 pounds at office visit 2/9/2024 (prior to GLP1)  Previous weight 258 pounds at office visit 2/21/2025 (on Wegovy 2.4 mg)  Previous weight 253 pounds on home scale 4/10/2025 (on Zepbound 5 mg)  Previous weight 259 pounds on home scale 5/8/2025 (on Zepbound 7.5 mg)  Current weight 252 pounds on home scale 6/5/2025 (on Zepbound 10 mg)    Pertinent PMH Review  PMH of  Pancreatitis: No  PMH of Gallbladder Disease: No  PMH of Retinopathy: No  PMH of MTC: No    Preventative Care  ASCVD Risk:   The 10-year ASCVD risk score (Chandu PARRY, et al., 2019) is: 1%    Values used to calculate the score:      Age: 54 years      Sex: Female      Is Non- : No      Diabetic: No      Tobacco smoker: No      Systolic Blood Pressure: 125 mmHg      Is BP treated: No      HDL Cholesterol: 58 mg/dL      Total Cholesterol: 141 mg/dL  On Statin: No      Medication System Management  Patient's preferred pharmacy: [facility] in [place], [place]  Adherence/Organization: no concerns identified  Affordability/Accessibility: no concerns at this time  Insurance coverage of weight-loss medications? Yes  Zepbound prior authorization approved through 3/2/2026  Cost is higher in the beginning of the year until deductible is met  Eligible for copay cards/programs? Yes      Objective   Allergies   Allergen Reactions    Penicillins Hives, Itching and Rash     Social History     Social History Narrative    Not on file      Medication Review  Current Outpatient Medications   Medication Instructions    levothyroxine (SYNTHROID, LEVOXYL) 75 mcg, oral, Daily    omeprazole (PriLOSEC) 20 mg DR capsule Take by mouth.    tirzepatide (weight loss) (ZEPBOUND) 10 mg, subcutaneous, Every 7 days      Vitals  BP Readings from Last 2 Encounters:   02/21/25 125/86   11/22/24 124/85     BMI Readings from Last 1 Encounters:   02/21/25 44.08 kg/m²      Labs  A1C  Lab Results   Component Value Date    HGBA1C 5.6 02/17/2025    HGBA1C 5.7 (H) 08/20/2024    HGBA1C 6.0 (H) 01/24/2024     BMP  Lab Results   Component Value Date    CALCIUM 8.9 02/17/2025     02/17/2025    K 4.1 02/17/2025    CO2 23 02/17/2025     02/17/2025    BUN 19 02/17/2025    CREATININE 0.77 02/17/2025    EGFR 92 02/17/2025     LFTs  Lab Results   Component Value Date    ALT 17 02/17/2025    AST 16 02/17/2025    ALKPHOS 111 02/17/2025     "BILITOT 0.3 02/17/2025     FLP  Lab Results   Component Value Date    TRIG 77 08/20/2024    CHOL 141 08/20/2024    LDLF 72 04/17/2023    LDLCALC 68 08/20/2024    HDL 58.0 08/20/2024     Urine Microalbumin  No results found for: \"MICROALBCREA\"  Weight Management  Wt Readings from Last 3 Encounters:   02/21/25 117 kg (258 lb 12.8 oz)   11/22/24 117 kg (259 lb)   08/09/24 120 kg (263 lb 12.8 oz)      There is no height or weight on file to calculate BMI.     Assessment/Plan   Problem List Items Addressed This Visit       Prediabetes    Relevant Medications    tirzepatide, weight loss, (Zepbound) 10 mg/0.5 mL injection    Other Relevant Orders    Referral to Clinical Pharmacy     Other Visit Diagnoses         Class 3 severe obesity without serious comorbidity with body mass index (BMI) of 45.0 to 49.9 in adult, unspecified obesity type        Relevant Medications    tirzepatide, weight loss, (Zepbound) 10 mg/0.5 mL injection    Other Relevant Orders    Referral to Clinical Pharmacy            DISCUSSION/PLAN:  Patient switched from Wegovy to Zepbound in March 2025 for improved weight loss potential. She has had more nausea and some episodes of vomiting since increasing Zepbound dose to 10 mg. Discussed portion sizes and food choices - vomiting may have been due to overeating. She has been getting outside more and drinking plenty of water. Weight is trending back down. Recommended incorporating strength exercises to preserve muscle mass. Will continue current Zepbound dose with close monitoring due to side effects. Follow up in 4 weeks or sooner if needed.    CONTINUE  Zepbound 10 mg under the skin once weekly on Sundays    Future Considerations:  Zepbound dose titration    Monitoring and Education:  Counseled on Zepbound mechanism of action, storage, side effects, and monitoring  Reviewed Zepbound administration and dose titration schedule  Answered all patient questions    Continue all meds under the continuation of " care with the referring provider and clinical pharmacy team.    Clinical Pharmacist follow-up: July 3rd, 2025 at 10:20 AM  Orders placed: Zepbound 10 mg to  [facility] for home delivery    Thank you,   Nadia Regalado, PharmD  Clinical Pharmacy Specialist, Primary Care   [phone]    Verbal consent to manage patient's drug therapy was obtained from the patient . Patient was informed she may decline to participate or withdraw from participation in pharmacy services at any time.

## 2025-06-09 ENCOUNTER — PHARMACY VISIT (OUTPATIENT)
Dept: PHARMACY | Facility: CLINIC | Age: 55
End: 2025-06-09
Payer: COMMERCIAL

## 2025-07-03 ENCOUNTER — APPOINTMENT (OUTPATIENT)
Dept: PHARMACY | Facility: HOSPITAL | Age: 55
End: 2025-07-03
Payer: COMMERCIAL

## 2025-07-03 DIAGNOSIS — E66.813 CLASS 3 SEVERE OBESITY WITHOUT SERIOUS COMORBIDITY WITH BODY MASS INDEX (BMI) OF 45.0 TO 49.9 IN ADULT, UNSPECIFIED OBESITY TYPE: ICD-10-CM

## 2025-07-03 DIAGNOSIS — R73.03 PREDIABETES: ICD-10-CM

## 2025-07-03 PROCEDURE — RXMED WILLOW AMBULATORY MEDICATION CHARGE

## 2025-07-03 NOTE — PROGRESS NOTES
Clinical Pharmacy Appointment    Patient ID: Joanne Weir is a 54 y.o. female who presents for Weight Management.    Pt is here for Follow Up appointment.     Referring Provider: Karson Mack DO  PCP: Karson Mack DO   Last visit with PCP: 2/21/2025   Next visit with PCP: 8/22/2025    INTERVAL HISTORY   Patient's last appointment with clinical pharmacy team on 6/5/2025  Recent hospitalizations? No   Medication changes? No   Missed doses of medications? No   Side effects? No  Updated relevant labs? No   Changes to diet or exercise regimen? No      Subjective     HPI  WEIGHT MANAGEMENT  PMH significant for prediabetes  Special needs/barriers to therapy: none at this time    BMI Readings from Last 1 Encounters:   02/21/25 44.08 kg/m²     Lab Results   Component Value Date    HGBA1C 5.6 02/17/2025    GLUCOSE 91 02/17/2025     Lifestyle  Diet: 3 meals/day, rarely snacks with Wegovy  Breakfast: croissant sandwich or bagel  Lunch: chicken, salad, or soup  Dinner: variable  Has worked with nutritionist/dietician? No  Physical Activity: walking   Alcohol Use: 3 drinks per week    Non-Pharmacological Therapy  Weight loss techniques attempted:  Self-directed dieting: reduced calorie diets    Pharmacological Therapy  Current Medications:   Zepbound 10 mg once weekly    Previous Medications:   Wegovy - titrated up to 2.4 mg, switched to Zepbound    Weight Loss  Starting weight 281 pounds at office visit 2/9/2024 (prior to GLP1)  Previous weight 258 pounds at office visit 2/21/2025 (on Wegovy 2.4 mg)  Previous weight 253 pounds on home scale 4/10/2025 (on Zepbound 5 mg)  Previous weight 259 pounds on home scale 5/8/2025 (on Zepbound 7.5 mg)  Previous weight 252 pounds on home scale 6/5/2025 (on Zepbound 10 mg)  Current weight 252 pounds on home scale 7/3/2025 (on Zepbound 10 mg)    Pertinent PMH Review  PMH of Pancreatitis: No  PMH of Gallbladder Disease: No  PMH of Retinopathy: No  PMH of MTC: No    Preventative  Care  ASCVD Risk:   The 10-year ASCVD risk score (Chandu PARRY, et al., 2019) is: 1%    Values used to calculate the score:      Age: 54 years      Sex: Female      Is Non- : No      Diabetic: No      Tobacco smoker: No      Systolic Blood Pressure: 125 mmHg      Is BP treated: No      HDL Cholesterol: 58 mg/dL      Total Cholesterol: 141 mg/dL  On Statin: No      Medication System Management  Patient's preferred pharmacy: [facility] in [place], [place]  Adherence/Organization: no concerns identified  Affordability/Accessibility: no concerns at this time  Insurance coverage of weight-loss medications? Yes  Zepbound prior authorization approved through 3/2/2026  Cost is higher in the beginning of the year until deductible is met  Eligible for copay cards/programs? Yes      Objective   Allergies   Allergen Reactions    Penicillins Hives, Itching and Rash     Social History     Social History Narrative    Not on file      Medication Review  Current Outpatient Medications   Medication Instructions    levothyroxine (SYNTHROID, LEVOXYL) 75 mcg, oral, Daily    omeprazole (PriLOSEC) 20 mg DR capsule Take by mouth.    tirzepatide (weight loss) (ZEPBOUND) 12.5 mg, subcutaneous, Every 7 days      Vitals  BP Readings from Last 2 Encounters:   02/21/25 125/86   11/22/24 124/85     BMI Readings from Last 1 Encounters:   02/21/25 44.08 kg/m²      Labs  A1C  Lab Results   Component Value Date    HGBA1C 5.6 02/17/2025    HGBA1C 5.7 (H) 08/20/2024    HGBA1C 6.0 (H) 01/24/2024     BMP  Lab Results   Component Value Date    CALCIUM 8.9 02/17/2025     02/17/2025    K 4.1 02/17/2025    CO2 23 02/17/2025     02/17/2025    BUN 19 02/17/2025    CREATININE 0.77 02/17/2025    EGFR 92 02/17/2025     LFTs  Lab Results   Component Value Date    ALT 17 02/17/2025    AST 16 02/17/2025    ALKPHOS 111 02/17/2025    BILITOT 0.3 02/17/2025     FLP  Lab Results   Component Value Date    TRIG 77 08/20/2024    CHOL 141  "08/20/2024    LDLF 72 04/17/2023    LDLCALC 68 08/20/2024    HDL 58.0 08/20/2024     Urine Microalbumin  No results found for: \"MICROALBCREA\"  Weight Management  Wt Readings from Last 3 Encounters:   02/21/25 117 kg (258 lb 12.8 oz)   11/22/24 117 kg (259 lb)   08/09/24 120 kg (263 lb 12.8 oz)      There is no height or weight on file to calculate BMI.     Assessment/Plan   Problem List Items Addressed This Visit       Prediabetes    Relevant Medications    tirzepatide, weight loss, (Zepbound) 12.5 mg/0.5 mL injection    Other Relevant Orders    Referral to Clinical Pharmacy     Other Visit Diagnoses         Class 3 severe obesity without serious comorbidity with body mass index (BMI) of 45.0 to 49.9 in adult, unspecified obesity type        Relevant Medications    tirzepatide, weight loss, (Zepbound) 12.5 mg/0.5 mL injection    Other Relevant Orders    Referral to Clinical Pharmacy            DISCUSSION/PLAN:  Patient switched from Wegovy to Zepbound in March 2025 for improved weight loss potential. She denies significant side effects or missed doses at this time. She prioritizing protein intake and is having regular bowel movements. Weight is unchanged since last visit. We discussed incorporating more physical activity into routines. Will also increase Zepbound dose as she is tolerating well. Follow up in 4 weeks or sooner if needed.    INCREASE  Zepbound to 12.5 mg under the skin once weekly on Sundays    Future Considerations:  Zepbound dose titration    Monitoring and Education:  Counseled on Zepbound mechanism of action, storage, side effects, and monitoring  Reviewed Zepbound administration and dose titration schedule  Answered all patient questions    Continue all meds under the continuation of care with the referring provider and clinical pharmacy team.    Clinical Pharmacist follow-up: July 31st, 2025 at 10:20 AM  Orders placed: Zepbound 12.5 mg to  [place] for home delivery    Thank you,   Nadia FRY" Cm Reaglado  Clinical Pharmacy Specialist, Primary Care   [phone]    Verbal consent to manage patient's drug therapy was obtained from the patient . Patient was informed she may decline to participate or withdraw from participation in pharmacy services at any time.

## 2025-07-08 ENCOUNTER — PHARMACY VISIT (OUTPATIENT)
Dept: PHARMACY | Facility: CLINIC | Age: 55
End: 2025-07-08
Payer: COMMERCIAL

## 2025-07-11 DIAGNOSIS — E03.9 HYPOTHYROIDISM, UNSPECIFIED: ICD-10-CM

## 2025-07-11 RX ORDER — LEVOTHYROXINE SODIUM 75 UG/1
75 TABLET ORAL DAILY
Qty: 90 TABLET | Refills: 1 | Status: SHIPPED | OUTPATIENT
Start: 2025-07-11

## 2025-07-24 ENCOUNTER — APPOINTMENT (OUTPATIENT)
Dept: RADIOLOGY | Facility: CLINIC | Age: 55
End: 2025-07-24
Payer: COMMERCIAL

## 2025-07-24 VITALS — BODY MASS INDEX: 44.04 KG/M2 | WEIGHT: 257.94 LBS | HEIGHT: 64 IN

## 2025-07-24 DIAGNOSIS — Z12.31 ENCOUNTER FOR SCREENING MAMMOGRAM FOR MALIGNANT NEOPLASM OF BREAST: ICD-10-CM

## 2025-07-24 PROCEDURE — 77063 BREAST TOMOSYNTHESIS BI: CPT

## 2025-07-24 PROCEDURE — 77067 SCR MAMMO BI INCL CAD: CPT | Performed by: RADIOLOGY

## 2025-07-24 PROCEDURE — 77063 BREAST TOMOSYNTHESIS BI: CPT | Performed by: RADIOLOGY

## 2025-07-31 ENCOUNTER — APPOINTMENT (OUTPATIENT)
Dept: PHARMACY | Facility: HOSPITAL | Age: 55
End: 2025-07-31
Payer: COMMERCIAL

## 2025-07-31 DIAGNOSIS — E66.813 CLASS 3 SEVERE OBESITY WITHOUT SERIOUS COMORBIDITY WITH BODY MASS INDEX (BMI) OF 45.0 TO 49.9 IN ADULT, UNSPECIFIED OBESITY TYPE: Primary | ICD-10-CM

## 2025-07-31 DIAGNOSIS — R73.03 PREDIABETES: ICD-10-CM

## 2025-07-31 PROCEDURE — RXMED WILLOW AMBULATORY MEDICATION CHARGE

## 2025-07-31 NOTE — PROGRESS NOTES
Clinical Pharmacy Appointment    Patient ID: Joanne Weir is a 55 y.o. female who presents for Weight Management.    Pt is here for Follow Up appointment.     Referring Provider: Karson Mack DO  PCP: Karson Mack DO   Last visit with PCP: 2/21/2025   Next visit with PCP: 8/22/2025    INTERVAL HISTORY   Patient's last appointment with clinical pharmacy team on 7/3/2025  Recent hospitalizations? No   Medication changes? No   Missed doses of medications? No   Side effects? No  Updated relevant labs? No   Changes to diet or exercise regimen? Yes  Patient has been going to the gym consistently, about 3 times per week      Subjective     HPI  WEIGHT MANAGEMENT  PMH significant for prediabetes  Special needs/barriers to therapy: none at this time    BMI Readings from Last 1 Encounters:   07/24/25 43.93 kg/m²     Lab Results   Component Value Date    HGBA1C 5.6 02/17/2025    GLUCOSE 91 02/17/2025     Lifestyle  Diet: 3 meals/day, minimal snacks  Breakfast: croissant sandwich or bagel  Lunch: chicken, salad, or soup  Dinner: variable  Has worked with nutritionist/dietician? No  Physical Activity: walking   Alcohol Use: 3 drinks per week    Non-Pharmacological Therapy  Weight loss techniques attempted:  Self-directed dieting: reduced calorie diets    Pharmacological Therapy  Current Medications:   Zepbound 10 mg once weekly    Previous Medications:   Wegovy - titrated up to 2.4 mg, switched to Zepbound    Weight Loss  Starting weight 281 pounds at office visit 2/9/2024 (prior to GLP1)  Previous weight 258 pounds at office visit 2/21/2025 (on Wegovy 2.4 mg)  Previous weight 253 pounds on home scale 4/10/2025 (on Zepbound 5 mg)  Previous weight 259 pounds on home scale 5/8/2025 (on Zepbound 7.5 mg)  Previous weight 252 pounds on home scale 6/5/2025 (on Zepbound 10 mg)  Previous weight 252 pounds on home scale 7/3/2025 (on Zepbound 10 mg)  Current weight 251 pounds on home scale 7/31/2025 (on Zepbound 12.5  mg)    Pertinent PMH Review  PMH of Pancreatitis: No  PMH of Gallbladder Disease: No  PMH of Retinopathy: No  PMH of MTC: No    Preventative Care  ASCVD Risk:   The 10-year ASCVD risk score (Chandu PARRY, et al., 2019) is: 1.1%    Values used to calculate the score:      Age: 55 years      Clincally relevant sex: Female      Is Non- : No      Diabetic: No      Tobacco smoker: No      Systolic Blood Pressure: 125 mmHg      Is BP treated: No      HDL Cholesterol: 58 mg/dL      Total Cholesterol: 141 mg/dL  On Statin: No      Medication System Management  Patient's preferred pharmacy: [facility] in [place], [place]  Adherence/Organization: no concerns identified  Affordability/Accessibility: no concerns at this time  Insurance coverage of weight-loss medications? Yes  Zepbound prior authorization approved through 3/2/2026  Cost is higher in the beginning of the year until deductible is met  Eligible for copay cards/programs? Yes      Objective   Allergies   Allergen Reactions    Penicillins Hives, Itching and Rash     Social History     Social History Narrative    Not on file      Medication Review  Current Outpatient Medications   Medication Instructions    levothyroxine (SYNTHROID, LEVOXYL) 75 mcg, oral, Daily    omeprazole (PriLOSEC) 20 mg DR capsule Take by mouth.    tirzepatide (weight loss) (ZEPBOUND) 15 mg, subcutaneous, Every 7 days      Vitals  BP Readings from Last 2 Encounters:   02/21/25 125/86   11/22/24 124/85     BMI Readings from Last 1 Encounters:   07/24/25 43.93 kg/m²      Labs  A1C  Lab Results   Component Value Date    HGBA1C 5.6 02/17/2025    HGBA1C 5.7 (H) 08/20/2024    HGBA1C 6.0 (H) 01/24/2024     BMP  Lab Results   Component Value Date    CALCIUM 8.9 02/17/2025     02/17/2025    K 4.1 02/17/2025    CO2 23 02/17/2025     02/17/2025    BUN 19 02/17/2025    CREATININE 0.77 02/17/2025    EGFR 92 02/17/2025     LFTs  Lab Results   Component Value Date    ALT 17  "02/17/2025    AST 16 02/17/2025    ALKPHOS 111 02/17/2025    BILITOT 0.3 02/17/2025     FLP  Lab Results   Component Value Date    TRIG 77 08/20/2024    CHOL 141 08/20/2024    LDLF 72 04/17/2023    LDLCALC 68 08/20/2024    HDL 58.0 08/20/2024     Urine Microalbumin  No results found for: \"MICROALBCREA\"  Weight Management  Wt Readings from Last 3 Encounters:   07/24/25 117 kg (257 lb 15 oz)   02/21/25 117 kg (258 lb 12.8 oz)   11/22/24 117 kg (259 lb)      There is no height or weight on file to calculate BMI.     Assessment/Plan   Problem List Items Addressed This Visit       Prediabetes    Relevant Medications    tirzepatide, weight loss, (Zepbound) 15 mg/0.5 mL injection    Other Relevant Orders    Referral to Clinical Pharmacy     Other Visit Diagnoses         Class 3 severe obesity without serious comorbidity with body mass index (BMI) of 45.0 to 49.9 in adult, unspecified obesity type    -  Primary    Relevant Medications    tirzepatide, weight loss, (Zepbound) 15 mg/0.5 mL injection    Other Relevant Orders    Referral to Clinical Pharmacy            DISCUSSION/PLAN:  Patient switched from Wegovy to Zepbound in March 2025 for weight loss and other benefits. She denies significant side effects or missed doses at this time. Patient has been incorporating more exercise by going to the gym at least 3 times per week. Weight loss progress is slow - down about one pound since last visit. Will increase Zepbound dose to 15 mg for improved weight loss potential as she is tolerating well. Follow up in 4 weeks or sooner if needed.    INCREASE  Zepbound to 15 mg under the skin once weekly on Sundays    Monitoring and Education:  Counseled on Zepbound mechanism of action, storage, side effects, and monitoring  Reviewed Zepbound administration and dose titration schedule  Answered all patient questions    Continue all meds under the continuation of care with the referring provider and clinical pharmacy team.    Clinical " Pharmacist follow-up: August 28th, 2025 at 10:40 AM  Orders placed: Zepbound 15 mg to  [place] for home delivery    Thank you,   Nadia Regalado, PharmD  Clinical Pharmacy Specialist, Primary Care   [phone]    Verbal consent to manage patient's drug therapy was obtained from the patient . Patient was informed she may decline to participate or withdraw from participation in pharmacy services at any time.

## 2025-08-02 ENCOUNTER — PHARMACY VISIT (OUTPATIENT)
Dept: PHARMACY | Facility: CLINIC | Age: 55
End: 2025-08-02
Payer: COMMERCIAL

## 2025-08-15 DIAGNOSIS — Z00.00 ROUTINE GENERAL MEDICAL EXAMINATION AT A HEALTH CARE FACILITY: ICD-10-CM

## 2025-08-15 DIAGNOSIS — R73.03 PREDIABETES: Primary | ICD-10-CM

## 2025-08-15 DIAGNOSIS — E03.9 HYPOTHYROIDISM, UNSPECIFIED TYPE: ICD-10-CM

## 2025-08-20 LAB
ALBUMIN SERPL-MCNC: 4.3 G/DL (ref 3.6–5.1)
ALP SERPL-CCNC: 101 U/L (ref 37–153)
ALT SERPL-CCNC: 12 U/L (ref 6–29)
ANION GAP SERPL CALCULATED.4IONS-SCNC: 11 MMOL/L (CALC) (ref 7–17)
AST SERPL-CCNC: 16 U/L (ref 10–35)
BASOPHILS # BLD AUTO: 69 CELLS/UL (ref 0–200)
BASOPHILS NFR BLD AUTO: 0.9 %
BILIRUB SERPL-MCNC: 0.4 MG/DL (ref 0.2–1.2)
BUN SERPL-MCNC: 15 MG/DL (ref 7–25)
CALCIUM SERPL-MCNC: 9.3 MG/DL (ref 8.6–10.4)
CHLORIDE SERPL-SCNC: 104 MMOL/L (ref 98–110)
CHOLEST SERPL-MCNC: 147 MG/DL
CHOLEST/HDLC SERPL: 2.4 (CALC)
CO2 SERPL-SCNC: 24 MMOL/L (ref 20–32)
CREAT SERPL-MCNC: 0.85 MG/DL (ref 0.5–1.03)
EGFRCR SERPLBLD CKD-EPI 2021: 81 ML/MIN/1.73M2
EOSINOPHIL # BLD AUTO: 470 CELLS/UL (ref 15–500)
EOSINOPHIL NFR BLD AUTO: 6.1 %
ERYTHROCYTE [DISTWIDTH] IN BLOOD BY AUTOMATED COUNT: 13.9 % (ref 11–15)
EST. AVERAGE GLUCOSE BLD GHB EST-MCNC: 111 MG/DL
EST. AVERAGE GLUCOSE BLD GHB EST-SCNC: 6.2 MMOL/L
GLUCOSE SERPL-MCNC: 84 MG/DL (ref 65–99)
HBA1C MFR BLD: 5.5 %
HCT VFR BLD AUTO: 42.2 % (ref 35–45)
HDLC SERPL-MCNC: 61 MG/DL
HGB BLD-MCNC: 13.1 G/DL (ref 11.7–15.5)
LDLC SERPL CALC-MCNC: 71 MG/DL (CALC)
LYMPHOCYTES # BLD AUTO: 2333 CELLS/UL (ref 850–3900)
LYMPHOCYTES NFR BLD AUTO: 30.3 %
MCH RBC QN AUTO: 27.1 PG (ref 27–33)
MCHC RBC AUTO-ENTMCNC: 31 G/DL (ref 32–36)
MCV RBC AUTO: 87.2 FL (ref 80–100)
MONOCYTES # BLD AUTO: 562 CELLS/UL (ref 200–950)
MONOCYTES NFR BLD AUTO: 7.3 %
NEUTROPHILS # BLD AUTO: 4266 CELLS/UL (ref 1500–7800)
NEUTROPHILS NFR BLD AUTO: 55.4 %
NONHDLC SERPL-MCNC: 86 MG/DL (CALC)
PLATELET # BLD AUTO: 264 THOUSAND/UL (ref 140–400)
PMV BLD REES-ECKER: 14 FL (ref 7.5–12.5)
POTASSIUM SERPL-SCNC: 4.1 MMOL/L (ref 3.5–5.3)
PROT SERPL-MCNC: 6.9 G/DL (ref 6.1–8.1)
RBC # BLD AUTO: 4.84 MILLION/UL (ref 3.8–5.1)
SODIUM SERPL-SCNC: 139 MMOL/L (ref 135–146)
TRIGL SERPL-MCNC: 71 MG/DL
TSH SERPL-ACNC: 2.05 MIU/L
WBC # BLD AUTO: 7.7 THOUSAND/UL (ref 3.8–10.8)

## 2025-08-22 ENCOUNTER — APPOINTMENT (OUTPATIENT)
Dept: PRIMARY CARE | Facility: CLINIC | Age: 55
End: 2025-08-22
Payer: COMMERCIAL

## 2025-08-22 VITALS
WEIGHT: 252.6 LBS | HEART RATE: 65 BPM | TEMPERATURE: 97.2 F | OXYGEN SATURATION: 98 % | SYSTOLIC BLOOD PRESSURE: 110 MMHG | DIASTOLIC BLOOD PRESSURE: 80 MMHG | HEIGHT: 64 IN | BODY MASS INDEX: 43.13 KG/M2

## 2025-08-22 DIAGNOSIS — Z23 IMMUNIZATION DUE: ICD-10-CM

## 2025-08-22 DIAGNOSIS — E03.9 HYPOTHYROIDISM, UNSPECIFIED TYPE: ICD-10-CM

## 2025-08-22 DIAGNOSIS — R73.03 PREDIABETES: Primary | ICD-10-CM

## 2025-08-22 DIAGNOSIS — K21.9 GASTROESOPHAGEAL REFLUX DISEASE WITHOUT ESOPHAGITIS: ICD-10-CM

## 2025-08-22 PROCEDURE — 90677 PCV20 VACCINE IM: CPT | Performed by: FAMILY MEDICINE

## 2025-08-22 PROCEDURE — 1036F TOBACCO NON-USER: CPT | Performed by: FAMILY MEDICINE

## 2025-08-22 PROCEDURE — 99214 OFFICE O/P EST MOD 30 MIN: CPT | Performed by: FAMILY MEDICINE

## 2025-08-22 PROCEDURE — 90471 IMMUNIZATION ADMIN: CPT | Performed by: FAMILY MEDICINE

## 2025-08-22 PROCEDURE — 3008F BODY MASS INDEX DOCD: CPT | Performed by: FAMILY MEDICINE

## 2025-08-22 ASSESSMENT — ENCOUNTER SYMPTOMS
OCCASIONAL FEELINGS OF UNSTEADINESS: 0
DEPRESSION: 0
LOSS OF SENSATION IN FEET: 0

## 2025-08-28 ENCOUNTER — APPOINTMENT (OUTPATIENT)
Dept: PHARMACY | Facility: HOSPITAL | Age: 55
End: 2025-08-28
Payer: COMMERCIAL

## 2025-08-28 DIAGNOSIS — E66.813 CLASS 3 SEVERE OBESITY WITHOUT SERIOUS COMORBIDITY WITH BODY MASS INDEX (BMI) OF 45.0 TO 49.9 IN ADULT, UNSPECIFIED OBESITY TYPE: ICD-10-CM

## 2025-08-28 DIAGNOSIS — R73.03 PREDIABETES: ICD-10-CM

## 2025-08-28 PROCEDURE — RXMED WILLOW AMBULATORY MEDICATION CHARGE

## 2025-08-30 ENCOUNTER — PHARMACY VISIT (OUTPATIENT)
Dept: PHARMACY | Facility: CLINIC | Age: 55
End: 2025-08-30
Payer: COMMERCIAL

## 2025-11-20 ENCOUNTER — APPOINTMENT (OUTPATIENT)
Dept: PHARMACY | Facility: HOSPITAL | Age: 55
End: 2025-11-20
Payer: COMMERCIAL

## 2026-02-18 ENCOUNTER — APPOINTMENT (OUTPATIENT)
Dept: PRIMARY CARE | Facility: CLINIC | Age: 56
End: 2026-02-18
Payer: COMMERCIAL